# Patient Record
Sex: FEMALE | Race: WHITE | NOT HISPANIC OR LATINO | Employment: OTHER | URBAN - METROPOLITAN AREA
[De-identification: names, ages, dates, MRNs, and addresses within clinical notes are randomized per-mention and may not be internally consistent; named-entity substitution may affect disease eponyms.]

---

## 2017-01-06 ENCOUNTER — OFFICE VISIT (OUTPATIENT)
Dept: FAMILY MEDICINE CLINIC | Facility: CLINIC | Age: 77
End: 2017-01-06

## 2017-01-06 VITALS
SYSTOLIC BLOOD PRESSURE: 152 MMHG | OXYGEN SATURATION: 98 % | DIASTOLIC BLOOD PRESSURE: 92 MMHG | HEART RATE: 90 BPM | RESPIRATION RATE: 24 BRPM | TEMPERATURE: 96.4 F | WEIGHT: 175.5 LBS | BODY MASS INDEX: 34.46 KG/M2 | HEIGHT: 60 IN

## 2017-01-06 DIAGNOSIS — J45.31 MILD PERSISTENT ASTHMA WITH ACUTE EXACERBATION: ICD-10-CM

## 2017-01-06 DIAGNOSIS — J06.9 PROTRACTED URI: Primary | ICD-10-CM

## 2017-01-06 DIAGNOSIS — J98.01 BRONCHOSPASM: ICD-10-CM

## 2017-01-06 PROCEDURE — 99213 OFFICE O/P EST LOW 20 MIN: CPT | Performed by: FAMILY MEDICINE

## 2017-01-06 RX ORDER — MECLIZINE HYDROCHLORIDE 25 MG/1
TABLET ORAL
COMMUNITY
Start: 2016-12-24 | End: 2017-03-24 | Stop reason: SDUPTHER

## 2017-01-06 RX ORDER — PREDNISONE 20 MG/1
40 TABLET ORAL DAILY
Qty: 10 TABLET | Refills: 0 | Status: SHIPPED | OUTPATIENT
Start: 2017-01-06 | End: 2017-01-11

## 2017-01-06 RX ORDER — DOXYCYCLINE HYCLATE 100 MG/1
100 CAPSULE ORAL 2 TIMES DAILY
Qty: 20 CAPSULE | Refills: 0 | Status: SHIPPED | OUTPATIENT
Start: 2017-01-06 | End: 2017-02-01

## 2017-01-06 NOTE — MR AVS SNAPSHOT
Raymond Coburn   1/6/2017 3:00 PM   Office Visit    Dept Phone:  [phone]   Encounter #:  [id number]    Provider:  Dikc Blunt MD   Department:  [facility]                Your Full Care Plan              Today's Medication Changes          These changes are accurate as of: 1/6/17  3:36 PM.  If you have any questions, ask your nurse or doctor.               New Medication(s)Ordered:     doxycycline 100 MG capsule   Commonly known as:  VIBRAMYCIN   Take 1 capsule by mouth 2 (Two) Times a Day.   Started by:  Dick Blunt MD       mometasone-formoterol 100-5 MCG/ACT inhaler   Commonly known as:  DULERA 100   Inhale 2 puffs 2 (Two) Times a Day.   Started by:  Dick Blunt MD         Medication(s)that have changed:     * predniSONE 5 MG tablet   Commonly known as:  DELTASONE   What changed:  Another medication with the same name was added. Make sure you understand how and when to take each.   Changed by:  Dick Blunt MD       * predniSONE 20 MG tablet   Commonly known as:  DELTASONE   Take 2 tablets by mouth Daily for 5 days.   What changed:  You were already taking a medication with the same name, and this prescription was added. Make sure you understand how and when to take each.   Changed by:  Dick Blunt MD       * Notice:  This list has 2 medication(s) that are the same as other medications prescribed for you. Read the directions carefully, and ask your doctor or other care provider to review them with you.      Stop taking medication(s)listed here:     fluticasone-salmeterol 250-50 MCG/DOSE DISKUS   Commonly known as:  ADVAIR   Stopped by:  Dick Blunt MD                Where to Get Your Medications      [facility] [address] - [address] [facility] [place] - [phone]  - [phone]  [address] [facility] [address]     Phone:  [phone]    doxycycline 100 MG capsule    predniSONE 20 MG tablet         Information about where to get these medications is not yet available     ! Ask your nurse or doctor about these medications     mometasone-formoterol 100-5 MCG/ACT inhaler                  Your Updated Medication List          This list is accurate as of: 1/6/17  3:36 PM.  Always use your most recent med list.                albuterol 108 (90 BASE) MCG/ACT inhaler   Commonly known as:  PROVENTIL HFA;VENTOLIN HFA       amLODIPine 10 MG tablet   Commonly known as:  NORVASC   TAKE 1 TABLET EVERY DAY       clotrimazole 1 % cream   Commonly known as:  LOTRIMIN   Apply to affected areas BID       doxycycline 100 MG capsule   Commonly known as:  VIBRAMYCIN   Take 1 capsule by mouth 2 (Two) Times a Day.       DULoxetine 60 MG capsule   Commonly known as:  CYMBALTA   Take 1 capsule by mouth daily.       folic acid 1 MG tablet   Commonly known as:  FOLVITE       gabapentin 800 MG tablet   Commonly known as:  NEURONTIN   TAKE 1 TABLET THREE TIMES DAILY       glipiZIDE 5 MG ER tablet   Commonly known as:  GLUCOTROL   TAKE 1 TABLET TWICE DAILY       hydrOXYzine 25 MG capsule   Commonly known as:  VISTARIL   TAKE 1 CAPSULE TWICE DAILY       lisinopril 20 MG tablet   Commonly known as:  PRINIVIL,ZESTRIL   TAKE 1 TABLET DAILY       meclizine 25 MG tablet   Commonly known as:  ANTIVERT       methotrexate 2.5 MG tablet       metoprolol tartrate 50 MG tablet   Commonly known as:  LOPRESSOR   TAKE 1 TABLET TWICE DAILY       mometasone-formoterol 100-5 MCG/ACT inhaler   Commonly known as:  DULERA 100   Inhale 2 puffs 2 (Two) Times a Day.       oxyCODONE 20 MG tablet   Commonly known as:  ROXICODONE   Take 1 tablet by mouth Every 12 (Twelve) Hours.       potassium chloride 10 MEQ CR tablet   Commonly known as:  K-DUR,KLOR-CON   TAKE 2 TABLETS TWICE DAILY       * predniSONE 5 MG tablet   Commonly known as:  DELTASONE       * predniSONE 20 MG tablet   Commonly known as:   DELTASONE   Take 2 tablets by mouth Daily for 5 days.       simvastatin 20 MG tablet   Commonly known as:  ZOCOR   TAKE 1 TABLET EVERY DAY       traMADol 50 MG tablet   Commonly known as:  ULTRAM   TAKE ONE TO TWO TABLETS BY MOUTH THREE TIMES A DAY       traZODone 100 MG tablet   Commonly known as:  DESYREL   TAKE THREE TABLETS BY MOUTH ONCE NIGHTLY       zolpidem 10 MG tablet   Commonly known as:  AMBIEN   TAKE ONE TABLET BY MOUTH EVERY NIGHT AT BEDTIME       * Notice:  This list has 2 medication(s) that are the same as other medications prescribed for you. Read the directions carefully, and ask your doctor or other care provider to review them with you.            You Were Diagnosed With        Codes Comments    Protracted URI    -  Primary ICD-10-CM: J06.9  ICD-9-CM: 465.9     Bronchospasm     ICD-10-CM: J98.01  ICD-9-CM: 519.11     Mild persistent asthma with acute exacerbation     ICD-10-CM: J45.31  ICD-9-CM: 493.92       Medications to be Given to You by a Medical Professional     Due       Frequency    2017 cyanocobalamin injection 1,000 mcg  Every 28 Days      Instructions     None    Patient Instructions History      Upcoming Appointments     Visit Type Date Time Department    FOLLOW UP 2017  3:00 PM [facility]      [employer] Signup     [facility] [employer] allows you to send messages to your doctor, view your test results, renew your prescriptions, schedule appointments, and more. To sign up, go to [employer] and click on the Sign Up Now link in the New User? box. Enter your [employer] Activation Code exactly as it appears below along with the last four digits of your Social Security Number and your Date of Birth () to complete the sign-up process. If you do not sign up before the expiration date, you must request a new code.    [employer] Activation Code: [email or web address]  Expires: 2017  3:36 PM    If you have questions, you can email [email or web address] or  "call [phone] to talk to our MyChart staff. Remember, MyChart is NOT to be used for urgent needs. For medical emergencies, dial 911.               Other Info from Your Visit           Allergies     Aspirin      Butorphanol      Codeine      Iodine        Reason for Visit     CHENTE patel getting up is white, has been going on since christmas eve she went to ER and hasn't gotten any better    Cough     Nasal Congestion           Vital Signs     Blood Pressure Pulse Temperature Respirations Height Weight    152/92 90 96.4 °F (35.8 °C) (Temporal Artery ) 24 60\" (152.4 cm) 175 lb 8 oz (79.6 kg)    Oxygen Saturation Body Mass Index Smoking Status             98% 34.27 kg/m2 Former Smoker         Problems and Diagnoses Noted     Asthma    Acute upper respiratory infection    -  Primary    Bronchial spasms            "

## 2017-01-06 NOTE — PROGRESS NOTES
Chief Complaint   Patient presents with   • URI     muscus getting up is white, has been going on since christmas eve she went to ER and hasn't gotten any better   • Cough   • Nasal Congestion       Subjective     URI    This is a new problem. The current episode started 1 to 4 weeks ago (was sick Niall story and was seen in ER and given inhaler). The problem has been gradually worsening. There has been no fever. Associated symptoms include congestion, coughing (prod cough, white and milky), rhinorrhea, a sore throat and wheezing. Associated symptoms comments: short of breath.   Cough   Associated symptoms include rhinorrhea, a sore throat, shortness of breath and wheezing. Pertinent negatives include no fever.           Past Medical History,Medications, Allergies, and social history was reviewed.    Review of Systems   Constitutional: Positive for fatigue. Negative for fever.   HENT: Positive for congestion, rhinorrhea and sore throat.    Respiratory: Positive for cough (prod cough, white and milky), shortness of breath and wheezing.    Cardiovascular: Negative.    Gastrointestinal: Negative.    Neurological: Negative.    Psychiatric/Behavioral: Positive for sleep disturbance.       Objective     Physical Exam   Constitutional: She is oriented to person, place, and time. She appears well-developed and well-nourished.   HENT:   Head: Normocephalic and atraumatic.   Right Ear: Hearing, external ear and ear canal normal. Tympanic membrane is retracted. Tympanic membrane is not erythematous.   Left Ear: Hearing, external ear and ear canal normal. Tympanic membrane is retracted. Tympanic membrane is not erythematous.   Nose: Nose normal.   Mouth/Throat: Oropharynx is clear and moist.   Eyes: Conjunctivae and EOM are normal. Pupils are equal, round, and reactive to light.   Neck: Normal range of motion. Neck supple. No thyromegaly present.   Cardiovascular: Normal rate, regular rhythm and normal heart sounds.     Pulmonary/Chest: Effort normal. She has wheezes. She has no rales.   Abdominal: Soft.   Neurological: She is alert and oriented to person, place, and time.   Skin: Skin is warm and dry.   Psychiatric: She has a normal mood and affect. Her behavior is normal.   Nursing note and vitals reviewed.        Assessment/Plan     Problem List Items Addressed This Visit        Respiratory    Asthma    Relevant Medications    doxycycline (VIBRAMYCIN) 100 MG capsule    predniSONE (DELTASONE) 20 MG tablet    mometasone-formoterol (DULERA 100) 100-5 MCG/ACT inhaler      Other Visit Diagnoses     Protracted URI    -  Primary    Relevant Medications    doxycycline (VIBRAMYCIN) 100 MG capsule    predniSONE (DELTASONE) 20 MG tablet    Bronchospasm        Relevant Medications    doxycycline (VIBRAMYCIN) 100 MG capsule    predniSONE (DELTASONE) 20 MG tablet    mometasone-formoterol (DULERA 100) 100-5 MCG/ACT inhaler              DISCUSSION  Start meds  Stop Advair(not using anyway) and try Dulera as noted  Prednisone and doxycyline  Continue ALbuterol As needed  Call in 2-3 days with update  Recommend emergency room evaluation if worsening.  Oxygen level is good.    Return if symptoms worsen or fail to improve.     MEDICATIONS PRESCRIBED  Requested Prescriptions     Signed Prescriptions Disp Refills   • doxycycline (VIBRAMYCIN) 100 MG capsule 20 capsule 0     Sig: Take 1 capsule by mouth 2 (Two) Times a Day.   • predniSONE (DELTASONE) 20 MG tablet 10 tablet 0     Sig: Take 2 tablets by mouth Daily for 5 days.   • mometasone-formoterol (DULERA 100) 100-5 MCG/ACT inhaler 1 inhaler 0     Sig: Inhale 2 puffs 2 (Two) Times a Day.          Dick Blunt MD

## 2017-01-09 ENCOUNTER — TELEPHONE (OUTPATIENT)
Dept: FAMILY MEDICINE CLINIC | Facility: CLINIC | Age: 77
End: 2017-01-09

## 2017-01-09 DIAGNOSIS — G89.4 CHRONIC PAIN SYNDROME: ICD-10-CM

## 2017-01-09 DIAGNOSIS — M06.9 RHEUMATOID ARTHRITIS INVOLVING MULTIPLE SITES, UNSPECIFIED RHEUMATOID FACTOR PRESENCE: ICD-10-CM

## 2017-01-09 RX ORDER — OXYCODONE HYDROCHLORIDE 20 MG/1
20 TABLET ORAL EVERY 12 HOURS
Qty: 60 TABLET | Refills: 0 | Status: SHIPPED | OUTPATIENT
Start: 2017-01-09 | End: 2017-02-09 | Stop reason: SDUPTHER

## 2017-01-09 NOTE — TELEPHONE ENCOUNTER
Okay to  and please run Obi.  How is she feeling today?  She was seen last week when she was sick.

## 2017-01-09 NOTE — TELEPHONE ENCOUNTER
----- Message from Arianne Syed sent at 1/9/2017  9:31 AM EST -----  Contact: Blunt  Patient calling in refill for Oxycodone 20mg. Please call her when ready at [phone].

## 2017-01-10 ENCOUNTER — CLINICAL SUPPORT (OUTPATIENT)
Dept: FAMILY MEDICINE CLINIC | Facility: CLINIC | Age: 77
End: 2017-01-10

## 2017-01-10 DIAGNOSIS — E53.8 LOW VITAMIN B12 LEVEL: ICD-10-CM

## 2017-01-10 PROCEDURE — 96372 THER/PROPH/DIAG INJ SC/IM: CPT | Performed by: FAMILY MEDICINE

## 2017-01-10 RX ADMIN — CYANOCOBALAMIN 1000 MCG: 1000 INJECTION, SOLUTION INTRAMUSCULAR; SUBCUTANEOUS at 15:18

## 2017-01-30 ENCOUNTER — TELEPHONE (OUTPATIENT)
Dept: FAMILY MEDICINE CLINIC | Facility: CLINIC | Age: 77
End: 2017-01-30

## 2017-02-01 DIAGNOSIS — F32.A DEPRESSION: ICD-10-CM

## 2017-02-01 RX ORDER — AZITHROMYCIN 250 MG/1
TABLET, FILM COATED ORAL
Qty: 6 TABLET | Refills: 0 | Status: SHIPPED | OUTPATIENT
Start: 2017-02-01 | End: 2017-03-24

## 2017-02-01 NOTE — TELEPHONE ENCOUNTER
Diarrhea could be due to antibiotic usage. Encourage probiotics or yogurt with probiotics in it. Can use OTC Imodium as directed for diarrhea. Increase fluid intake.   Can try treatment with zpak since she has already completed Doxycycline and steroids. Sent to [facility]

## 2017-02-01 NOTE — TELEPHONE ENCOUNTER
Pt is continuing to have runny nose and congestion, no color with drainage and cough, pt has been having diarrhea   and oh her niece isn't sure if this is part of this that pt has going on or something separate.   Please call [phone] oh

## 2017-02-02 RX ORDER — DULOXETIN HYDROCHLORIDE 60 MG/1
CAPSULE, DELAYED RELEASE ORAL
Qty: 90 CAPSULE | Refills: 1 | Status: SHIPPED | OUTPATIENT
Start: 2017-02-02 | End: 2017-09-28 | Stop reason: SDUPTHER

## 2017-02-02 RX ORDER — GABAPENTIN 800 MG/1
TABLET ORAL
Qty: 270 TABLET | Refills: 1 | Status: SHIPPED | OUTPATIENT
Start: 2017-02-02 | End: 2017-09-28 | Stop reason: SDUPTHER

## 2017-02-02 RX ORDER — SIMVASTATIN 20 MG
TABLET ORAL
Qty: 90 TABLET | Refills: 0 | Status: SHIPPED | OUTPATIENT
Start: 2017-02-02 | End: 2017-05-25 | Stop reason: SDUPTHER

## 2017-02-02 RX ORDER — POTASSIUM CHLORIDE 750 MG/1
TABLET, EXTENDED RELEASE ORAL
Qty: 360 TABLET | Refills: 1 | Status: SHIPPED | OUTPATIENT
Start: 2017-02-02 | End: 2017-09-28 | Stop reason: SDUPTHER

## 2017-02-09 ENCOUNTER — TELEPHONE (OUTPATIENT)
Dept: FAMILY MEDICINE CLINIC | Facility: CLINIC | Age: 77
End: 2017-02-09

## 2017-02-09 DIAGNOSIS — G89.4 CHRONIC PAIN SYNDROME: ICD-10-CM

## 2017-02-09 DIAGNOSIS — M06.9 RHEUMATOID ARTHRITIS INVOLVING MULTIPLE SITES, UNSPECIFIED RHEUMATOID FACTOR PRESENCE: ICD-10-CM

## 2017-02-09 RX ORDER — OXYCODONE HYDROCHLORIDE 20 MG/1
20 TABLET ORAL EVERY 12 HOURS
Qty: 60 TABLET | Refills: 0 | Status: SHIPPED | OUTPATIENT
Start: 2017-02-09 | End: 2017-03-07 | Stop reason: SDUPTHER

## 2017-02-09 NOTE — TELEPHONE ENCOUNTER
----- Message from Arianne Syed sent at 2/9/2017 11:34 AM EST -----  Contact: Bulnt  Patient is needing a refill on Oxycodone 20mg. Please call when ready [phone].

## 2017-02-13 ENCOUNTER — CLINICAL SUPPORT (OUTPATIENT)
Dept: FAMILY MEDICINE CLINIC | Facility: CLINIC | Age: 77
End: 2017-02-13

## 2017-02-13 DIAGNOSIS — E78.2 MIXED HYPERLIPIDEMIA: ICD-10-CM

## 2017-02-13 DIAGNOSIS — E53.8 LOW VITAMIN B12 LEVEL: ICD-10-CM

## 2017-02-13 DIAGNOSIS — I10 BENIGN ESSENTIAL HYPERTENSION: Primary | ICD-10-CM

## 2017-02-13 DIAGNOSIS — G47.09 OTHER INSOMNIA: ICD-10-CM

## 2017-02-13 DIAGNOSIS — E11.9 TYPE 2 DIABETES MELLITUS WITHOUT COMPLICATION, WITHOUT LONG-TERM CURRENT USE OF INSULIN (HCC): ICD-10-CM

## 2017-02-13 DIAGNOSIS — E53.8 VITAMIN B12 DEFICIENCY: ICD-10-CM

## 2017-02-13 PROCEDURE — 96372 THER/PROPH/DIAG INJ SC/IM: CPT | Performed by: FAMILY MEDICINE

## 2017-02-13 RX ORDER — TRAZODONE HYDROCHLORIDE 100 MG/1
TABLET ORAL
Qty: 270 TABLET | Refills: 0 | Status: SHIPPED | OUTPATIENT
Start: 2017-02-13 | End: 2017-03-24 | Stop reason: SDUPTHER

## 2017-02-13 RX ADMIN — CYANOCOBALAMIN 1000 MCG: 1000 INJECTION, SOLUTION INTRAMUSCULAR; SUBCUTANEOUS at 16:41

## 2017-02-14 LAB
ALBUMIN SERPL-MCNC: 4.5 G/DL (ref 3.2–4.8)
ALBUMIN/GLOB SERPL: 1.7 G/DL (ref 1.5–2.5)
ALP SERPL-CCNC: 69 U/L (ref 25–100)
ALT SERPL-CCNC: 15 U/L (ref 7–40)
AST SERPL-CCNC: 22 U/L (ref 0–33)
BASOPHILS # BLD AUTO: 0.04 10*3/MM3 (ref 0–0.2)
BASOPHILS NFR BLD AUTO: 0.5 % (ref 0–1)
BILIRUB SERPL-MCNC: 0.6 MG/DL (ref 0.3–1.2)
BUN SERPL-MCNC: 14 MG/DL (ref 9–23)
BUN/CREAT SERPL: 17.5 (ref 7–25)
CALCIUM SERPL-MCNC: 10.8 MG/DL (ref 8.7–10.4)
CHLORIDE SERPL-SCNC: 107 MMOL/L (ref 99–109)
CHOLEST SERPL-MCNC: 207 MG/DL (ref 0–200)
CHOLEST/HDLC SERPL: 3.76 {RATIO}
CO2 SERPL-SCNC: 31 MMOL/L (ref 20–31)
CREAT SERPL-MCNC: 0.8 MG/DL (ref 0.6–1.3)
EOSINOPHIL # BLD AUTO: 0.05 10*3/MM3 (ref 0.1–0.3)
EOSINOPHIL NFR BLD AUTO: 0.6 % (ref 0–3)
ERYTHROCYTE [DISTWIDTH] IN BLOOD BY AUTOMATED COUNT: 14.1 % (ref 11.3–14.5)
GLOBULIN SER CALC-MCNC: 2.6 GM/DL
GLUCOSE SERPL-MCNC: 102 MG/DL (ref 70–100)
HBA1C MFR BLD: 5.5 % (ref 4.8–5.6)
HCT VFR BLD AUTO: 44.4 % (ref 34.5–44)
HDLC SERPL-MCNC: 55 MG/DL (ref 40–60)
HGB BLD-MCNC: 14.7 G/DL (ref 11.5–15.5)
IMM GRANULOCYTES # BLD: 0.06 10*3/MM3 (ref 0–0.03)
IMM GRANULOCYTES NFR BLD: 0.7 % (ref 0–0.6)
LDLC SERPL CALC-MCNC: 107 MG/DL (ref 0–100)
LYMPHOCYTES # BLD AUTO: 1.24 10*3/MM3 (ref 0.6–4.8)
LYMPHOCYTES NFR BLD AUTO: 14.4 % (ref 24–44)
MCH RBC QN AUTO: 32.8 PG (ref 27–31)
MCHC RBC AUTO-ENTMCNC: 33.1 G/DL (ref 32–36)
MCV RBC AUTO: 99.1 FL (ref 80–99)
MONOCYTES # BLD AUTO: 0.26 10*3/MM3 (ref 0–1)
MONOCYTES NFR BLD AUTO: 3 % (ref 0–12)
NEUTROPHILS # BLD AUTO: 6.95 10*3/MM3 (ref 1.5–8.3)
NEUTROPHILS NFR BLD AUTO: 80.8 % (ref 41–71)
PLATELET # BLD AUTO: 213 10*3/MM3 (ref 150–450)
POTASSIUM SERPL-SCNC: 5.5 MMOL/L (ref 3.5–5.5)
PROT SERPL-MCNC: 7.1 G/DL (ref 5.7–8.2)
RBC # BLD AUTO: 4.48 10*6/MM3 (ref 3.89–5.14)
SODIUM SERPL-SCNC: 141 MMOL/L (ref 132–146)
TRIGL SERPL-MCNC: 225 MG/DL (ref 0–150)
VIT B12 SERPL-MCNC: 460 PG/ML (ref 211–911)
VLDLC SERPL CALC-MCNC: 45 MG/DL
WBC # BLD AUTO: 8.6 10*3/MM3 (ref 3.5–10.8)

## 2017-03-07 ENCOUNTER — TELEPHONE (OUTPATIENT)
Dept: FAMILY MEDICINE CLINIC | Facility: CLINIC | Age: 77
End: 2017-03-07

## 2017-03-07 DIAGNOSIS — M06.9 RHEUMATOID ARTHRITIS INVOLVING MULTIPLE SITES, UNSPECIFIED RHEUMATOID FACTOR PRESENCE: ICD-10-CM

## 2017-03-07 DIAGNOSIS — G89.4 CHRONIC PAIN SYNDROME: ICD-10-CM

## 2017-03-07 RX ORDER — OXYCODONE HYDROCHLORIDE 20 MG/1
20 TABLET ORAL EVERY 12 HOURS
Qty: 60 TABLET | Refills: 0 | Status: SHIPPED | OUTPATIENT
Start: 2017-03-07 | End: 2017-03-24 | Stop reason: SDUPTHER

## 2017-03-07 NOTE — TELEPHONE ENCOUNTER
----- Message from Rosemary Mendoza sent at 3/7/2017 11:56 AM EST -----  Contact: RHONDA PEREA ON OXYCODONE 20MG    [phone]  MESSAGE OK

## 2017-03-24 ENCOUNTER — OFFICE VISIT (OUTPATIENT)
Dept: FAMILY MEDICINE CLINIC | Facility: CLINIC | Age: 77
End: 2017-03-24

## 2017-03-24 VITALS
SYSTOLIC BLOOD PRESSURE: 124 MMHG | DIASTOLIC BLOOD PRESSURE: 64 MMHG | BODY MASS INDEX: 35.54 KG/M2 | WEIGHT: 182 LBS | OXYGEN SATURATION: 93 % | HEART RATE: 60 BPM | RESPIRATION RATE: 16 BRPM | TEMPERATURE: 96.6 F

## 2017-03-24 DIAGNOSIS — E53.8 LOW VITAMIN B12 LEVEL: ICD-10-CM

## 2017-03-24 DIAGNOSIS — R42 DIZZINESS: Primary | ICD-10-CM

## 2017-03-24 DIAGNOSIS — G47.09 OTHER INSOMNIA: ICD-10-CM

## 2017-03-24 DIAGNOSIS — M06.9 RHEUMATOID ARTHRITIS INVOLVING MULTIPLE SITES, UNSPECIFIED RHEUMATOID FACTOR PRESENCE: ICD-10-CM

## 2017-03-24 DIAGNOSIS — I10 BENIGN ESSENTIAL HYPERTENSION: ICD-10-CM

## 2017-03-24 DIAGNOSIS — R19.7 DIARRHEA, UNSPECIFIED TYPE: ICD-10-CM

## 2017-03-24 DIAGNOSIS — G89.4 CHRONIC PAIN SYNDROME: ICD-10-CM

## 2017-03-24 PROCEDURE — 96372 THER/PROPH/DIAG INJ SC/IM: CPT | Performed by: FAMILY MEDICINE

## 2017-03-24 PROCEDURE — 99214 OFFICE O/P EST MOD 30 MIN: CPT | Performed by: FAMILY MEDICINE

## 2017-03-24 RX ORDER — MECLIZINE HYDROCHLORIDE 25 MG/1
25 TABLET ORAL 3 TIMES DAILY PRN
Qty: 30 TABLET | Refills: 1 | Status: SHIPPED | OUTPATIENT
Start: 2017-03-24 | End: 2017-12-18

## 2017-03-24 RX ORDER — TRAZODONE HYDROCHLORIDE 100 MG/1
200 TABLET ORAL NIGHTLY
Refills: 0 | COMMUNITY
Start: 2017-03-24 | End: 2017-09-18 | Stop reason: SDUPTHER

## 2017-03-24 RX ORDER — OXYCODONE HYDROCHLORIDE 20 MG/1
20 TABLET ORAL EVERY 12 HOURS
Qty: 60 TABLET | Refills: 0 | Status: SHIPPED | OUTPATIENT
Start: 2017-04-05 | End: 2017-05-15 | Stop reason: SDUPTHER

## 2017-03-24 RX ADMIN — CYANOCOBALAMIN 1000 MCG: 1000 INJECTION, SOLUTION INTRAMUSCULAR; SUBCUTANEOUS at 15:22

## 2017-03-24 NOTE — ASSESSMENT & PLAN NOTE
Hypertension is improving with treatment.  Continue current treatment regimen.  Stop smoking.  Continue current medications.  Blood pressure will be reassessed at the next regular appointment.

## 2017-03-24 NOTE — ASSESSMENT & PLAN NOTE
Decrease to 2 trazodone at night since it does not seem that 3 is even helping and she has had increased problems with confusion.  They will call if not improving with this and may need to consider decreasing gabapentin or the trazodone further.
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
## 2017-05-15 ENCOUNTER — TELEPHONE (OUTPATIENT)
Dept: FAMILY MEDICINE CLINIC | Facility: CLINIC | Age: 77
End: 2017-05-15

## 2017-05-15 DIAGNOSIS — G89.4 CHRONIC PAIN SYNDROME: ICD-10-CM

## 2017-05-15 DIAGNOSIS — M06.9 RHEUMATOID ARTHRITIS INVOLVING MULTIPLE SITES, UNSPECIFIED RHEUMATOID FACTOR PRESENCE: ICD-10-CM

## 2017-05-15 RX ORDER — OXYCODONE HYDROCHLORIDE 20 MG/1
20 TABLET ORAL EVERY 12 HOURS
Qty: 60 TABLET | Refills: 0 | Status: SHIPPED | OUTPATIENT
Start: 2017-05-15 | End: 2017-06-13 | Stop reason: SDUPTHER

## 2017-05-22 ENCOUNTER — CLINICAL SUPPORT (OUTPATIENT)
Dept: FAMILY MEDICINE CLINIC | Facility: CLINIC | Age: 77
End: 2017-05-22

## 2017-05-22 DIAGNOSIS — E11.9 DIABETES MELLITUS TYPE 2, NONINSULIN DEPENDENT (HCC): Primary | ICD-10-CM

## 2017-05-22 DIAGNOSIS — E53.8 VITAMIN B12 DEFICIENCY: ICD-10-CM

## 2017-05-22 DIAGNOSIS — E53.8 VITAMIN B12 DEFICIENCY: Primary | ICD-10-CM

## 2017-05-22 DIAGNOSIS — E78.5 HYPERLIPIDEMIA, UNSPECIFIED: ICD-10-CM

## 2017-05-22 PROCEDURE — 96372 THER/PROPH/DIAG INJ SC/IM: CPT | Performed by: FAMILY MEDICINE

## 2017-05-22 RX ADMIN — CYANOCOBALAMIN 1000 MCG: 1000 INJECTION, SOLUTION INTRAMUSCULAR; SUBCUTANEOUS at 15:38

## 2017-05-23 LAB
ALBUMIN SERPL-MCNC: 4.3 G/DL (ref 3.2–4.8)
ALBUMIN/GLOB SERPL: 1.7 G/DL (ref 1.5–2.5)
ALP SERPL-CCNC: 62 U/L (ref 25–100)
ALT SERPL-CCNC: 14 U/L (ref 7–40)
AST SERPL-CCNC: 20 U/L (ref 0–33)
BILIRUB SERPL-MCNC: 0.8 MG/DL (ref 0.3–1.2)
BUN SERPL-MCNC: 14 MG/DL (ref 9–23)
BUN/CREAT SERPL: 14 (ref 7–25)
CALCIUM SERPL-MCNC: 10.2 MG/DL (ref 8.7–10.4)
CHLORIDE SERPL-SCNC: 108 MMOL/L (ref 99–109)
CHOLEST SERPL-MCNC: 191 MG/DL (ref 0–200)
CHOLEST/HDLC SERPL: 4.06 {RATIO}
CO2 SERPL-SCNC: 32 MMOL/L (ref 20–31)
CREAT SERPL-MCNC: 1 MG/DL (ref 0.6–1.3)
GLOBULIN SER CALC-MCNC: 2.5 GM/DL
GLUCOSE SERPL-MCNC: 113 MG/DL (ref 70–100)
HBA1C MFR BLD: 5.1 % (ref 4.8–5.6)
HDLC SERPL-MCNC: 47 MG/DL (ref 40–60)
LDLC SERPL CALC-MCNC: 103 MG/DL (ref 0–100)
POTASSIUM SERPL-SCNC: 4.2 MMOL/L (ref 3.5–5.5)
PROT SERPL-MCNC: 6.8 G/DL (ref 5.7–8.2)
SODIUM SERPL-SCNC: 141 MMOL/L (ref 132–146)
TRIGL SERPL-MCNC: 204 MG/DL (ref 0–150)
VIT B12 SERPL-MCNC: 322 PG/ML (ref 211–911)
VLDLC SERPL CALC-MCNC: 40.8 MG/DL

## 2017-05-25 DIAGNOSIS — I10 BENIGN ESSENTIAL HYPERTENSION: ICD-10-CM

## 2017-05-25 DIAGNOSIS — F32.A DEPRESSION: ICD-10-CM

## 2017-05-25 RX ORDER — HYDROXYZINE PAMOATE 25 MG/1
CAPSULE ORAL
Qty: 180 CAPSULE | Refills: 1 | Status: SHIPPED | OUTPATIENT
Start: 2017-05-25 | End: 2017-12-18 | Stop reason: SDUPTHER

## 2017-05-25 RX ORDER — METOPROLOL TARTRATE 50 MG/1
TABLET, FILM COATED ORAL
Qty: 180 TABLET | Refills: 1 | Status: SHIPPED | OUTPATIENT
Start: 2017-05-25 | End: 2017-12-18 | Stop reason: SDUPTHER

## 2017-05-25 RX ORDER — LISINOPRIL 20 MG/1
TABLET ORAL
Qty: 90 TABLET | Refills: 1 | Status: SHIPPED | OUTPATIENT
Start: 2017-05-25 | End: 2017-12-18 | Stop reason: SDUPTHER

## 2017-05-25 RX ORDER — AMLODIPINE BESYLATE 10 MG/1
TABLET ORAL
Qty: 90 TABLET | Refills: 1 | Status: SHIPPED | OUTPATIENT
Start: 2017-05-25 | End: 2017-12-18 | Stop reason: SDUPTHER

## 2017-05-25 RX ORDER — GLIPIZIDE 5 MG/1
TABLET, EXTENDED RELEASE ORAL
Qty: 180 TABLET | Refills: 1 | Status: SHIPPED | OUTPATIENT
Start: 2017-05-25 | End: 2017-12-18 | Stop reason: SDUPTHER

## 2017-05-25 RX ORDER — SIMVASTATIN 20 MG
TABLET ORAL
Qty: 90 TABLET | Refills: 0 | Status: SHIPPED | OUTPATIENT
Start: 2017-05-25 | End: 2017-09-28 | Stop reason: SDUPTHER

## 2017-06-05 DIAGNOSIS — G47.09 OTHER INSOMNIA: ICD-10-CM

## 2017-06-05 NOTE — TELEPHONE ENCOUNTER
Please call patient and confirm.  We had decreased her trazodone to 2 tablets instead of 3 tablets.  Getting a refill request for the 3 tablets.  I need to make sure that she has decreased the trazodone to 100 mg 2 tablets at night and not 3.  Once confirmed, let me know and can reorder.

## 2017-06-06 RX ORDER — TRAZODONE HYDROCHLORIDE 100 MG/1
200 TABLET ORAL NIGHTLY
Qty: 180 TABLET | Refills: 1 | Status: SHIPPED | OUTPATIENT
Start: 2017-06-06 | End: 2017-06-20

## 2017-06-13 ENCOUNTER — TELEPHONE (OUTPATIENT)
Dept: FAMILY MEDICINE CLINIC | Facility: CLINIC | Age: 77
End: 2017-06-13

## 2017-06-13 DIAGNOSIS — M06.9 RHEUMATOID ARTHRITIS INVOLVING MULTIPLE SITES, UNSPECIFIED RHEUMATOID FACTOR PRESENCE: ICD-10-CM

## 2017-06-13 DIAGNOSIS — G89.4 CHRONIC PAIN SYNDROME: ICD-10-CM

## 2017-06-13 RX ORDER — OXYCODONE HYDROCHLORIDE 20 MG/1
20 TABLET ORAL EVERY 12 HOURS
Qty: 60 TABLET | Refills: 0 | Status: SHIPPED | OUTPATIENT
Start: 2017-06-13 | End: 2017-07-13

## 2017-06-13 NOTE — TELEPHONE ENCOUNTER
----- Message from Rosemary Mendoza sent at 6/13/2017  3:13 PM EDT -----  Contact: [facility] CALLED  REFILL ON OXYCODONE 20MG    [phone]  MESSAGE OK

## 2017-06-20 ENCOUNTER — OFFICE VISIT (OUTPATIENT)
Dept: FAMILY MEDICINE CLINIC | Facility: CLINIC | Age: 77
End: 2017-06-20

## 2017-06-20 VITALS
DIASTOLIC BLOOD PRESSURE: 66 MMHG | TEMPERATURE: 96.8 F | RESPIRATION RATE: 16 BRPM | OXYGEN SATURATION: 94 % | HEART RATE: 49 BPM | HEIGHT: 60 IN | SYSTOLIC BLOOD PRESSURE: 146 MMHG | BODY MASS INDEX: 34.75 KG/M2 | WEIGHT: 177 LBS

## 2017-06-20 DIAGNOSIS — M75.41 IMPINGEMENT SYNDROME OF SHOULDER REGION, RIGHT: ICD-10-CM

## 2017-06-20 DIAGNOSIS — I10 BENIGN ESSENTIAL HYPERTENSION: Primary | ICD-10-CM

## 2017-06-20 DIAGNOSIS — M06.9 RHEUMATOID ARTHRITIS INVOLVING MULTIPLE SITES, UNSPECIFIED RHEUMATOID FACTOR PRESENCE: ICD-10-CM

## 2017-06-20 DIAGNOSIS — E53.8 VITAMIN B12 DEFICIENCY: ICD-10-CM

## 2017-06-20 DIAGNOSIS — E11.9 TYPE 2 DIABETES MELLITUS WITHOUT COMPLICATION, WITHOUT LONG-TERM CURRENT USE OF INSULIN (HCC): ICD-10-CM

## 2017-06-20 PROCEDURE — 96372 THER/PROPH/DIAG INJ SC/IM: CPT | Performed by: FAMILY MEDICINE

## 2017-06-20 PROCEDURE — 99214 OFFICE O/P EST MOD 30 MIN: CPT | Performed by: FAMILY MEDICINE

## 2017-06-20 RX ADMIN — CYANOCOBALAMIN 1000 MCG: 1000 INJECTION, SOLUTION INTRAMUSCULAR; SUBCUTANEOUS at 16:04

## 2017-06-20 NOTE — PROGRESS NOTES
Chief Complaint   Patient presents with   • Hypertension   • Hyperlipidemia   • Depression   • Med Refill   • Diabetes       Subjective     Hypertension   This is a chronic problem. The current episode started more than 1 year ago. The problem is unchanged. The problem is controlled. Associated symptoms include malaise/fatigue. Pertinent negatives include no chest pain, peripheral edema or shortness of breath. There are no associated agents to hypertension. Risk factors for coronary artery disease include smoking/tobacco exposure. There is no history of angina, kidney disease or CAD/MI. There is no history of chronic renal disease.   Hyperlipidemia   This is a chronic problem. The current episode started more than 1 year ago. The problem is controlled. Recent lipid tests were reviewed and are normal. She has no history of chronic renal disease. Associated symptoms include myalgias. Pertinent negatives include no chest pain or shortness of breath. Current antihyperlipidemic treatment includes statins. The current treatment provides moderate improvement of lipids. There are no compliance problems.  Risk factors for coronary artery disease include diabetes mellitus, dyslipidemia and hypertension.   Depression   Visit Type: follow-up  Patient presents with the following symptoms: confusion, depressed mood (stable) and insomnia.  Patient is not experiencing: shortness of breath.  Frequency of symptoms: most days   Severity: mild   Sleep quality: poor      Diabetes   She presents for her follow-up diabetic visit. She has type 2 diabetes mellitus. Her disease course has been stable. Hypoglycemia symptoms include confusion. Pertinent negatives for diabetes include no chest pain. There are no hypoglycemic complications. Symptoms are stable. There are no diabetic complications. Current diabetic treatment includes oral agent (monotherapy). She is compliant with treatment all of the time. Her weight is decreasing steadily.  "Diabetic current diet: eats some sweets a time. She rarely participates in exercise. (Does not check sugars) An ACE inhibitor/angiotensin II receptor blocker is being taken. Eye exam is current.       RA  no change   Chronic pain in hands and wrist  HArd to  heavy things  Pain meds + help  On oxycodone and methotrexate    Insomnia  Sleep late in the am   Goes to bed: 12 am and falls asleep 2 am and up and 2-3 pm      Past Medical History,Medications, Allergies, and social history was reviewed.    Review of Systems   Constitutional: Positive for malaise/fatigue.   HENT: Negative.    Respiratory: Negative.  Negative for shortness of breath.    Cardiovascular: Negative.  Negative for chest pain.   Gastrointestinal: Negative.    Genitourinary: Negative.    Musculoskeletal: Positive for arthralgias, back pain, gait problem and myalgias.   Psychiatric/Behavioral: Positive for confusion and sleep disturbance. The patient has insomnia.        Objective     Vitals:    06/20/17 1535   BP: 146/66   Pulse: (!) 49   Resp: 16   Temp: 96.8 °F (36 °C)   TempSrc: Temporal Artery    SpO2: 94%   Weight: 177 lb (80.3 kg)   Height: 60\" (152.4 cm)        Physical Exam   Constitutional: She is oriented to person, place, and time. She appears well-developed and well-nourished.   HENT:   Head: Normocephalic and atraumatic.   Right Ear: Hearing, tympanic membrane, external ear and ear canal normal.   Left Ear: Hearing, tympanic membrane, external ear and ear canal normal.   Mouth/Throat: Oropharynx is clear and moist.   Eyes: Conjunctivae and EOM are normal. Pupils are equal, round, and reactive to light.   Neck: Normal range of motion. Neck supple. No thyromegaly present.   Cardiovascular: Normal rate, regular rhythm and normal heart sounds.  Exam reveals no gallop and no friction rub.    No murmur heard.  Pulmonary/Chest: Effort normal and breath sounds normal. No respiratory distress. She has no wheezes. She has no rales. "   Abdominal: Soft. Bowel sounds are normal.   Musculoskeletal: She exhibits no edema.   Chronic RA changes of hands with pain and tenderness.   Neurological: She is alert and oriented to person, place, and time. No sensory deficit. She displays a negative Romberg sign. Gait normal.   Reflex Scores:       Patellar reflexes are 2+ on the right side and 2+ on the left side.  Skin: Skin is warm and dry.   Psychiatric: She has a normal mood and affect. Her behavior is normal.   Seems a little confused.  We will ask repeated questions.   Nursing note and vitals reviewed.        Assessment/Plan     Problem List Items Addressed This Visit        Cardiovascular and Mediastinum    Benign essential hypertension - Primary       Endocrine    Diabetes mellitus       Musculoskeletal and Integument    Rheumatoid arthritis       Other    Impingement syndrome of shoulder region      Other Visit Diagnoses     Vitamin B12 deficiency               Follow up: Return in about 3 months (around 9/20/2017).     Obi dated on 5/16/2017  was reviewed and appropriate.      DISCUSSION  Reviewed recent blood work.  A1c was good.  Cholesterol stable.    Hypertension.  Stable.  Continue medication.    Diabetes mellitus.  Stable.    Rheumatoid arthritis with chronic pain.  Continue pain medication.  She has prescription at this time.  No evidence or denies misuse or diversion.    B12 deficiency.  Continue monthly B12 shots.    She is wondering why we stopped her 3 trazodone and decrease to 2 trazodone at night.  It sounds like she is sleeping much too late during the day and going to bed late.  However she expects to fall asleep at midnight even though she is getting up at 1 or 2 even 3 PM in the day.  Recommend that she get up at the same time every morning between 8 and 9 AM and stay up all day and try to get a sleep between 10 and midnight.  It may take several weeks to get back on track.  I explained to her that increasing the trazodone would  not help her get to sleep any better and may only worsen problems with confusion.  She expressed understanding but then asked again.  I explained it twice.    Her niece Noris is here who helps her and handles her medication.    MEDICATIONS PRESCRIBED  Requested Prescriptions      No prescriptions requested or ordered in this encounter          Dick Blunt MD

## 2017-07-03 ENCOUNTER — OFFICE VISIT (OUTPATIENT)
Dept: FAMILY MEDICINE CLINIC | Facility: CLINIC | Age: 77
End: 2017-07-03

## 2017-07-03 VITALS
RESPIRATION RATE: 16 BRPM | TEMPERATURE: 98.7 F | SYSTOLIC BLOOD PRESSURE: 156 MMHG | HEIGHT: 60 IN | BODY MASS INDEX: 35.14 KG/M2 | HEART RATE: 46 BPM | DIASTOLIC BLOOD PRESSURE: 66 MMHG | WEIGHT: 179 LBS | OXYGEN SATURATION: 96 %

## 2017-07-03 DIAGNOSIS — R41.0 CONFUSION: Primary | ICD-10-CM

## 2017-07-03 PROCEDURE — 99214 OFFICE O/P EST MOD 30 MIN: CPT | Performed by: FAMILY MEDICINE

## 2017-07-03 NOTE — PROGRESS NOTES
Chief Complaint   Patient presents with   • Altered Mental Status     on Wed pt had episode all day pt was confused and had dreamed of mother who has been past for over 20 yrs. pt grabbed her purse and drove all around looking for her mother, went to [place] and pt was confused on where she lived she kept telling everyone that she lived in [facility] and pt went to [facility] as she wanted to eat in cafe and from there they called her lynn casiano and they thought she needed to talk to Dr Blunt. someone at hospital seen pt was confused and they directed her to ER        Subjective     HPI    Confusion  Had episode of confusion  Had a dream that her mother was there and then she fed her and then she was gone so patient though she had left and then left and drove around looking for her. Drove all around to try to find her. Drove to [place] and [place]. She  25-30 yrs ago.   She was telling people she was from [place]  Had been driving and gone form 8:30 am to 10 pm    Has not been on Ambien for awhile    Wound up at [facility] ER. She was hungry and went there.   She realized she had dreamt this.    Noris was here and states not confused now. Has been acting ok.     Had seen Dr Renata Blunt in the past for confusion.   She was doing ok and then was not needed to follow up for 6 months and to be seen : 2017    Wants to stay at apartment for as long as possible        Past Medical History,Medications, Allergies, and social history was reviewed.    Review of Systems   Constitutional: Positive for fatigue.   HENT: Negative.    Respiratory: Positive for cough (chronic).    Cardiovascular: Negative.    Gastrointestinal: Negative.    Musculoskeletal: Positive for arthralgias (Chronic pain due to rheumatoid arthritis).   Neurological: Negative.    Psychiatric/Behavioral: Positive for confusion (see HPI) and sleep disturbance. The patient is nervous/anxious.        Objective     Vitals:    17 1450   BP: 156/66  "  Pulse: (!) 46   Resp: 16   Temp: 98.7 °F (37.1 °C)   TempSrc: Temporal Artery    SpO2: 96%   Weight: 179 lb (81.2 kg)   Height: 60\" (152.4 cm)        Physical Exam   Constitutional: She is oriented to person, place, and time. She appears well-developed and well-nourished.   HENT:   Head: Normocephalic and atraumatic.   Right Ear: Hearing and external ear normal.   Left Ear: Hearing and external ear normal.   Mouth/Throat: Oropharynx is clear and moist.   Eyes: Conjunctivae and EOM are normal. Pupils are equal, round, and reactive to light.   Cardiovascular: Normal rate, regular rhythm and normal heart sounds.  Exam reveals no friction rub.    No murmur heard.  Pulmonary/Chest: Effort normal and breath sounds normal. No respiratory distress. She has no wheezes. She has no rales.   Musculoskeletal: She exhibits no edema.   Neurological: She is alert and oriented to person, place, and time.   Skin: Skin is warm.   Psychiatric: She has a normal mood and affect. Her behavior is normal. Judgment and thought content normal. Cognition and memory are normal.   Tearful at times     Nursing note and vitals reviewed.    HR 60 on exam.     2017, July 3rd. Know where to look on calendar    WORLD...DLORW. Missed one    Apple Ball Coat.  ... apple coat ball(with prompt).     Assessment/Plan     Problem List Items Addressed This Visit     None      Visit Diagnoses     Confusion    -  Primary           Follow up: Return in about 1 month (around 8/3/2017).         DISCUSSION  Rec continue observation  Follow up with Dr Renata Blunt if having any further issues    Not sure what caused the episode of confusion.  Could be some of her medications but she has been on these for quite some time.  She is not taking Ambien and we are to decrease the trazodone.    We will need further evaluation if symptoms return.    Discussed with niece, Noris, about GPS  tracking in case she does this again.  She will look into.    Follow-up for regular " scheduled appointment or sooner with problems.      MEDICATIONS PRESCRIBED  Requested Prescriptions      No prescriptions requested or ordered in this encounter          Dick Blunt MD

## 2017-07-17 ENCOUNTER — TELEPHONE (OUTPATIENT)
Dept: FAMILY MEDICINE CLINIC | Facility: CLINIC | Age: 77
End: 2017-07-17

## 2017-07-17 RX ORDER — OXYCODONE HCL 20 MG/1
20 TABLET, FILM COATED, EXTENDED RELEASE ORAL EVERY 12 HOURS
Qty: 60 TABLET | Refills: 0 | Status: SHIPPED | OUTPATIENT
Start: 2017-07-17 | End: 2017-07-18

## 2017-07-17 NOTE — TELEPHONE ENCOUNTER
----- Message from Orquidea Perez sent at 7/17/2017  4:04 PM EDT -----  Contact: [facility]  PATIENT IS NEEDING A REFILL ON HIS OXYCODONE 20 MG 2 X A DAY PATIENT WAS ADVISED FOR 48 HOUR TURNAROUND TIME PLEASE CALL  OK TO LEAVE MS

## 2017-07-17 NOTE — TELEPHONE ENCOUNTER
Odd, could not find this on medication list but was on amparo and Dr. Blunt notes refilled in past.  Script printed out as per amparo

## 2017-07-18 DIAGNOSIS — M06.9 RHEUMATOID ARTHRITIS INVOLVING MULTIPLE SITES, UNSPECIFIED RHEUMATOID FACTOR PRESENCE: ICD-10-CM

## 2017-07-18 DIAGNOSIS — G89.4 CHRONIC PAIN SYNDROME: ICD-10-CM

## 2017-07-18 RX ORDER — OXYCODONE HYDROCHLORIDE 20 MG/1
20 TABLET ORAL EVERY 12 HOURS
Qty: 60 TABLET | Refills: 0 | Status: SHIPPED | OUTPATIENT
Start: 2017-07-18 | End: 2017-08-21 | Stop reason: SDUPTHER

## 2017-08-03 ENCOUNTER — CLINICAL SUPPORT (OUTPATIENT)
Dept: FAMILY MEDICINE CLINIC | Facility: CLINIC | Age: 77
End: 2017-08-03

## 2017-08-03 DIAGNOSIS — E53.8 B12 DEFICIENCY: ICD-10-CM

## 2017-08-03 PROCEDURE — 96372 THER/PROPH/DIAG INJ SC/IM: CPT | Performed by: FAMILY MEDICINE

## 2017-08-03 RX ADMIN — CYANOCOBALAMIN 1000 MCG: 1000 INJECTION, SOLUTION INTRAMUSCULAR; SUBCUTANEOUS at 15:04

## 2017-08-21 ENCOUNTER — TELEPHONE (OUTPATIENT)
Dept: FAMILY MEDICINE CLINIC | Facility: CLINIC | Age: 77
End: 2017-08-21

## 2017-08-21 DIAGNOSIS — M06.9 RHEUMATOID ARTHRITIS INVOLVING MULTIPLE SITES, UNSPECIFIED RHEUMATOID FACTOR PRESENCE: ICD-10-CM

## 2017-08-21 DIAGNOSIS — G89.4 CHRONIC PAIN SYNDROME: ICD-10-CM

## 2017-08-21 RX ORDER — OXYCODONE HYDROCHLORIDE 20 MG/1
20 TABLET ORAL EVERY 12 HOURS
Qty: 60 TABLET | Refills: 0 | Status: SHIPPED | OUTPATIENT
Start: 2017-08-21 | End: 2017-09-18 | Stop reason: SDUPTHER

## 2017-08-21 NOTE — TELEPHONE ENCOUNTER
----- Message from Rosemary Mendoza sent at 8/21/2017  8:35 AM EDT -----  Contact: NGA PEREA ON oxyCODONE (ROXICODONE) 20 MG tablet    [phone]  MESSAGE OK

## 2017-09-18 ENCOUNTER — OFFICE VISIT (OUTPATIENT)
Dept: FAMILY MEDICINE CLINIC | Facility: CLINIC | Age: 77
End: 2017-09-18

## 2017-09-18 VITALS
RESPIRATION RATE: 16 BRPM | DIASTOLIC BLOOD PRESSURE: 66 MMHG | SYSTOLIC BLOOD PRESSURE: 128 MMHG | HEART RATE: 60 BPM | BODY MASS INDEX: 35.24 KG/M2 | OXYGEN SATURATION: 92 % | HEIGHT: 60 IN | TEMPERATURE: 97.1 F | WEIGHT: 179.5 LBS

## 2017-09-18 DIAGNOSIS — N39.46 MIXED INCONTINENCE: ICD-10-CM

## 2017-09-18 DIAGNOSIS — M06.9 RHEUMATOID ARTHRITIS INVOLVING MULTIPLE SITES, UNSPECIFIED RHEUMATOID FACTOR PRESENCE: ICD-10-CM

## 2017-09-18 DIAGNOSIS — E53.8 B12 DEFICIENCY: ICD-10-CM

## 2017-09-18 DIAGNOSIS — G47.09 OTHER INSOMNIA: ICD-10-CM

## 2017-09-18 DIAGNOSIS — G89.4 CHRONIC PAIN SYNDROME: ICD-10-CM

## 2017-09-18 DIAGNOSIS — I10 BENIGN ESSENTIAL HYPERTENSION: Primary | ICD-10-CM

## 2017-09-18 PROCEDURE — 96372 THER/PROPH/DIAG INJ SC/IM: CPT | Performed by: FAMILY MEDICINE

## 2017-09-18 PROCEDURE — 99214 OFFICE O/P EST MOD 30 MIN: CPT | Performed by: FAMILY MEDICINE

## 2017-09-18 RX ORDER — OXYBUTYNIN CHLORIDE 5 MG/1
5 TABLET ORAL 2 TIMES DAILY
Qty: 60 TABLET | Refills: 2 | Status: SHIPPED | OUTPATIENT
Start: 2017-09-18 | End: 2017-12-18 | Stop reason: SDUPTHER

## 2017-09-18 RX ORDER — OXYCODONE HYDROCHLORIDE 20 MG/1
20 TABLET ORAL EVERY 12 HOURS
Qty: 60 TABLET | Refills: 0 | Status: SHIPPED | OUTPATIENT
Start: 2017-09-18 | End: 2017-10-24 | Stop reason: SDUPTHER

## 2017-09-18 RX ORDER — TRAZODONE HYDROCHLORIDE 100 MG/1
200 TABLET ORAL NIGHTLY
Qty: 60 TABLET | Refills: 5 | Status: SHIPPED | OUTPATIENT
Start: 2017-09-18 | End: 2018-05-17

## 2017-09-18 RX ADMIN — CYANOCOBALAMIN 1000 MCG: 1000 INJECTION, SOLUTION INTRAMUSCULAR; SUBCUTANEOUS at 16:36

## 2017-09-18 NOTE — ASSESSMENT & PLAN NOTE
Stable on trazodone.  I think she still is napping too much during the day which is causing issues at night.  Encouraged her not to nap during the day.  Continue the 2 trazodone only.

## 2017-09-18 NOTE — ASSESSMENT & PLAN NOTE
Continue follow-up with dermatology.  Chronic pain.  Oxycodone does help.  Denies misuse or diversion.

## 2017-09-18 NOTE — ASSESSMENT & PLAN NOTE
Hypertension is improving with treatment.  Continue current treatment regimen.  Stop smoking.  Continue current medications.  Blood pressure will be reassessed in 3 months.

## 2017-09-18 NOTE — PROGRESS NOTES
Assessment/Plan     Problem List Items Addressed This Visit        Cardiovascular and Mediastinum    Benign essential hypertension - Primary     Hypertension is improving with treatment.  Continue current treatment regimen.  Stop smoking.  Continue current medications.  Blood pressure will be reassessed in 3 months.            Musculoskeletal and Integument    Rheumatoid arthritis     Continue follow-up with dermatology.  Chronic pain.  Oxycodone does help.  Denies misuse or diversion.         Relevant Medications    oxyCODONE (ROXICODONE) 20 MG tablet       Hematopoietic and Hemostatic    Low vitamin B12 level     B12 shot today.  Doing well.  Last level was good.  Recheck in 3 months.            Other    Insomnia     Stable on trazodone.  I think she still is napping too much during the day which is causing issues at night.  Encouraged her not to nap during the day.  Continue the 2 trazodone only.         Relevant Medications    traZODone (DESYREL) 100 MG tablet      Other Visit Diagnoses     Mixed incontinence        Relevant Medications    oxybutynin (DITROPAN) 5 MG tablet    Chronic pain syndrome        Relevant Medications    oxyCODONE (ROXICODONE) 20 MG tablet           Follow up: Return in about 3 months (around 12/18/2017), or if symptoms worsen or fail to improve.     DISCUSSION  All other problems are stable at this time.  Because of the incontinence issue, we will try oxybutynin.  Side effects explained.  Call if not improving.      MEDICATIONS PRESCRIBED  Requested Prescriptions     Signed Prescriptions Disp Refills   • oxybutynin (DITROPAN) 5 MG tablet 60 tablet 2     Sig: Take 1 tablet by mouth 2 (Two) Times a Day.   • oxyCODONE (ROXICODONE) 20 MG tablet 60 tablet 0     Sig: Take 1 tablet by mouth Every 12 (Twelve) Hours.   • traZODone (DESYREL) 100 MG tablet 60 tablet 5     Sig: Take 2 tablets by mouth Every Night.        Obi dated on 9/27/2017  was reviewed and appropriate.         -------------------------------------------    Chief Complaint   Patient presents with   • Hypertension   • Hyperlipidemia   • Depression   • Med Refill   • Labs Only       Subjective         Hypertension   This is a chronic problem. The current episode started more than 1 year ago. The problem is unchanged. The problem is controlled. Associated symptoms include headaches (occ headache). Pertinent negatives include no chest pain, peripheral edema or shortness of breath. (Occ dizziness) There are no associated agents to hypertension. The current treatment provides moderate improvement. There is no history of angina or kidney disease. There is no history of chronic renal disease.   Hyperlipidemia   This is a chronic problem. The current episode started more than 1 year ago. The problem is controlled. Exacerbating diseases include diabetes. She has no history of chronic renal disease. Associated symptoms include myalgias. Pertinent negatives include no chest pain or shortness of breath. Current antihyperlipidemic treatment includes statins. The current treatment provides moderate improvement of lipids. Risk factors for coronary artery disease include hypertension.   Depression   Visit Type: follow-up  Patient presents with the following symptoms: depressed mood (at times), insomnia (decreased sleep at night. sleeps during the day) and nervousness/anxiety.  Patient is not experiencing: shortness of breath and suicidal ideas.  Severity: mild       Bladder leakage   at times.   leaks out. Wears depends and irritate her.   Goes to the bathroom and leaks after and cough and sneezing.   walks long distance    RA  increased shoulder pain . hurts to lift her shoulder up and radiates to arm  some pain in the left. Right is worse  + right rotator cuff tears    pain meds helps with hand and feet pain     Trigger finger right middle finger  getting more aggravated    Past Medical History,Medications, Allergies, and social  "history was reviewed.    Review of Systems   Constitutional: Positive for fatigue. Negative for unexpected weight change.   Respiratory: Negative.  Negative for shortness of breath.    Cardiovascular: Negative.  Negative for chest pain.   Gastrointestinal: Negative.    Genitourinary: Negative.         See history of present illness   Musculoskeletal: Positive for arthralgias, back pain, joint swelling and myalgias.   Neurological: Positive for headaches (occ headache).   Psychiatric/Behavioral: Negative for suicidal ideas. The patient is nervous/anxious and has insomnia (decreased sleep at night. sleeps during the day).        Objective     Vitals:    09/18/17 1441 09/18/17 1523   BP: 128/66    Pulse: (!) 45 60   Resp: 16    Temp: 97.1 °F (36.2 °C)    TempSrc: Temporal Artery     SpO2: 92%    Weight: 179 lb 8 oz (81.4 kg)    Height: 60\" (152.4 cm)         Physical Exam   Constitutional: She is oriented to person, place, and time. She appears well-developed and well-nourished.   HENT:   Head: Normocephalic and atraumatic.   Right Ear: Hearing and external ear normal.   Left Ear: Hearing and external ear normal.   Mouth/Throat: Oropharynx is clear and moist.   Eyes: Conjunctivae and EOM are normal. Pupils are equal, round, and reactive to light.   Cardiovascular: Normal rate, regular rhythm and normal heart sounds.  Exam reveals no friction rub.    No murmur heard.  Pulmonary/Chest: Effort normal and breath sounds normal. No respiratory distress. She has no wheezes. She has no rales.   Musculoskeletal: She exhibits no edema.        Right shoulder: She exhibits decreased range of motion.   Chronic changes of hands bilaterally consistent with rheumatoid arthritis.  No significant changes.   Neurological: She is alert and oriented to person, place, and time.   Skin: Skin is warm.   Psychiatric: She has a normal mood and affect. Her behavior is normal. Cognition and memory are normal.        Nursing note and vitals " reviewed.            Dick Blunt MD

## 2017-09-27 ENCOUNTER — TELEPHONE (OUTPATIENT)
Dept: FAMILY MEDICINE CLINIC | Facility: CLINIC | Age: 77
End: 2017-09-27

## 2017-09-27 DIAGNOSIS — Z01.00 DIABETIC EYE EXAM (HCC): ICD-10-CM

## 2017-09-27 DIAGNOSIS — E11.9 TYPE 2 DIABETES MELLITUS WITHOUT COMPLICATION, WITHOUT LONG-TERM CURRENT USE OF INSULIN (HCC): Primary | ICD-10-CM

## 2017-09-27 DIAGNOSIS — E11.9 DIABETIC EYE EXAM (HCC): ICD-10-CM

## 2017-09-27 NOTE — TELEPHONE ENCOUNTER
----- Message from Orquidea Perez sent at 9/27/2017  3:55 PM EDT -----  Contact: CARRASCO [facility]  PATIENT IS NEEDING HER ANNUAL [facility] REFERRAL FOR HER APPOINTMENT ON [phone] PLEASE ORDER AND SEND OVER

## 2017-09-28 DIAGNOSIS — F32.A DEPRESSION: ICD-10-CM

## 2017-09-28 RX ORDER — SIMVASTATIN 20 MG
TABLET ORAL
Qty: 90 TABLET | Refills: 0 | Status: SHIPPED | OUTPATIENT
Start: 2017-09-28 | End: 2017-12-18 | Stop reason: SDUPTHER

## 2017-09-28 RX ORDER — DULOXETIN HYDROCHLORIDE 60 MG/1
CAPSULE, DELAYED RELEASE ORAL
Qty: 90 CAPSULE | Refills: 1 | Status: SHIPPED | OUTPATIENT
Start: 2017-09-28 | End: 2018-04-16 | Stop reason: SDUPTHER

## 2017-09-28 RX ORDER — GABAPENTIN 800 MG/1
TABLET ORAL
Qty: 270 TABLET | Refills: 1 | Status: SHIPPED | OUTPATIENT
Start: 2017-09-28 | End: 2018-07-19 | Stop reason: SDUPTHER

## 2017-09-28 RX ORDER — POTASSIUM CHLORIDE 750 MG/1
TABLET, EXTENDED RELEASE ORAL
Qty: 360 TABLET | Refills: 1 | Status: SHIPPED | OUTPATIENT
Start: 2017-09-28 | End: 2018-04-16 | Stop reason: SDUPTHER

## 2017-09-28 NOTE — TELEPHONE ENCOUNTER
FAXED TO [facility] FOR PT PER DR CARRASCO.

## 2017-09-28 NOTE — TELEPHONE ENCOUNTER
Jolie at [facility] confirmed it is with Dr. Vides.  I would enter referral but unsure of diagnosis to use.

## 2017-09-28 NOTE — TELEPHONE ENCOUNTER
Please call or fax gabapentin 800 mg 1 3 times a day to [facility] mail order #[id number]+ one refill.

## 2017-10-24 ENCOUNTER — TELEPHONE (OUTPATIENT)
Dept: FAMILY MEDICINE CLINIC | Facility: CLINIC | Age: 77
End: 2017-10-24

## 2017-10-24 DIAGNOSIS — G89.4 CHRONIC PAIN SYNDROME: ICD-10-CM

## 2017-10-24 DIAGNOSIS — M06.9 RHEUMATOID ARTHRITIS INVOLVING MULTIPLE SITES, UNSPECIFIED RHEUMATOID FACTOR PRESENCE: ICD-10-CM

## 2017-10-24 RX ORDER — OXYCODONE HYDROCHLORIDE 20 MG/1
20 TABLET ORAL EVERY 12 HOURS
Qty: 60 TABLET | Refills: 0 | Status: SHIPPED | OUTPATIENT
Start: 2017-10-24 | End: 2017-11-20 | Stop reason: SDUPTHER

## 2017-10-24 NOTE — TELEPHONE ENCOUNTER
----- Message from Heather Mcnamara sent at 10/24/2017  8:08 AM EDT -----  Contact: DR. CARRASCO; MED REFILL   PT IS NEEDING A REFILL ON THE FOLLOWING MEDICATION     oxyCODONE (ROXICODONE) 20 MG tablet

## 2017-11-20 ENCOUNTER — CLINICAL SUPPORT (OUTPATIENT)
Dept: FAMILY MEDICINE CLINIC | Facility: CLINIC | Age: 77
End: 2017-11-20

## 2017-11-20 ENCOUNTER — TELEPHONE (OUTPATIENT)
Dept: FAMILY MEDICINE CLINIC | Facility: CLINIC | Age: 77
End: 2017-11-20

## 2017-11-20 DIAGNOSIS — G89.4 CHRONIC PAIN SYNDROME: ICD-10-CM

## 2017-11-20 DIAGNOSIS — E53.8 B12 DEFICIENCY: ICD-10-CM

## 2017-11-20 DIAGNOSIS — M06.9 RHEUMATOID ARTHRITIS INVOLVING MULTIPLE SITES, UNSPECIFIED RHEUMATOID FACTOR PRESENCE: ICD-10-CM

## 2017-11-20 PROCEDURE — 96372 THER/PROPH/DIAG INJ SC/IM: CPT | Performed by: FAMILY MEDICINE

## 2017-11-20 RX ORDER — OXYCODONE HYDROCHLORIDE 20 MG/1
20 TABLET ORAL EVERY 12 HOURS
Qty: 60 TABLET | Refills: 0 | Status: SHIPPED | OUTPATIENT
Start: 2017-11-20 | End: 2017-12-18 | Stop reason: SDUPTHER

## 2017-11-20 RX ADMIN — CYANOCOBALAMIN 1000 MCG: 1000 INJECTION, SOLUTION INTRAMUSCULAR; SUBCUTANEOUS at 16:54

## 2017-11-20 NOTE — TELEPHONE ENCOUNTER
----- Message from Heather Mcnamara sent at 11/20/2017  2:35 PM EST -----  Contact: dr. noonan; med refill   Pt is needing a refill on the oxycodone.       Call back   [phone]

## 2017-12-18 ENCOUNTER — OFFICE VISIT (OUTPATIENT)
Dept: FAMILY MEDICINE CLINIC | Facility: CLINIC | Age: 77
End: 2017-12-18

## 2017-12-18 VITALS
BODY MASS INDEX: 35.93 KG/M2 | RESPIRATION RATE: 16 BRPM | WEIGHT: 183 LBS | HEIGHT: 60 IN | OXYGEN SATURATION: 96 % | DIASTOLIC BLOOD PRESSURE: 60 MMHG | SYSTOLIC BLOOD PRESSURE: 132 MMHG | TEMPERATURE: 96.8 F | HEART RATE: 60 BPM

## 2017-12-18 DIAGNOSIS — M06.9 RHEUMATOID ARTHRITIS INVOLVING MULTIPLE SITES, UNSPECIFIED RHEUMATOID FACTOR PRESENCE: ICD-10-CM

## 2017-12-18 DIAGNOSIS — R19.7 DIARRHEA, UNSPECIFIED TYPE: ICD-10-CM

## 2017-12-18 DIAGNOSIS — N39.46 MIXED INCONTINENCE: ICD-10-CM

## 2017-12-18 DIAGNOSIS — E78.2 MIXED HYPERLIPIDEMIA: ICD-10-CM

## 2017-12-18 DIAGNOSIS — F41.9 ANXIETY: ICD-10-CM

## 2017-12-18 DIAGNOSIS — I10 BENIGN ESSENTIAL HYPERTENSION: Primary | ICD-10-CM

## 2017-12-18 DIAGNOSIS — E53.8 LOW VITAMIN B12 LEVEL: ICD-10-CM

## 2017-12-18 DIAGNOSIS — G89.4 CHRONIC PAIN SYNDROME: ICD-10-CM

## 2017-12-18 DIAGNOSIS — E11.9 TYPE 2 DIABETES MELLITUS WITHOUT COMPLICATION, WITHOUT LONG-TERM CURRENT USE OF INSULIN (HCC): ICD-10-CM

## 2017-12-18 LAB
ALBUMIN SERPL-MCNC: 4.1 G/DL (ref 3.2–4.8)
ALBUMIN/GLOB SERPL: 1.8 G/DL (ref 1.5–2.5)
ALP SERPL-CCNC: 66 U/L (ref 25–100)
ALT SERPL-CCNC: 13 U/L (ref 7–40)
AST SERPL-CCNC: 20 U/L (ref 0–33)
BASOPHILS # BLD AUTO: 0.05 10*3/MM3 (ref 0–0.2)
BASOPHILS NFR BLD AUTO: 0.6 % (ref 0–1)
BILIRUB SERPL-MCNC: 0.4 MG/DL (ref 0.3–1.2)
BUN SERPL-MCNC: 20 MG/DL (ref 9–23)
BUN/CREAT SERPL: 22.2 (ref 7–25)
CALCIUM SERPL-MCNC: 9.6 MG/DL (ref 8.7–10.4)
CHLORIDE SERPL-SCNC: 107 MMOL/L (ref 99–109)
CHOLEST SERPL-MCNC: 204 MG/DL (ref 0–200)
CHOLEST/HDLC SERPL: 3.92 {RATIO}
CO2 SERPL-SCNC: 32 MMOL/L (ref 20–31)
CREAT SERPL-MCNC: 0.9 MG/DL (ref 0.6–1.3)
EOSINOPHIL # BLD AUTO: 0.32 10*3/MM3 (ref 0–0.3)
EOSINOPHIL NFR BLD AUTO: 3.7 % (ref 0–3)
ERYTHROCYTE [DISTWIDTH] IN BLOOD BY AUTOMATED COUNT: 13.9 % (ref 11.3–14.5)
GFR SERPLBLD CREATININE-BSD FMLA CKD-EPI: 61 ML/MIN/1.73
GFR SERPLBLD CREATININE-BSD FMLA CKD-EPI: 74 ML/MIN/1.73
GLOBULIN SER CALC-MCNC: 2.3 GM/DL
GLUCOSE SERPL-MCNC: 84 MG/DL (ref 70–100)
HBA1C MFR BLD: 5.4 % (ref 4.8–5.6)
HCT VFR BLD AUTO: 42.5 % (ref 34.5–44)
HDLC SERPL-MCNC: 52 MG/DL (ref 40–60)
HGB BLD-MCNC: 14 G/DL (ref 11.5–15.5)
IMM GRANULOCYTES # BLD: 0.04 10*3/MM3 (ref 0–0.03)
IMM GRANULOCYTES NFR BLD: 0.5 % (ref 0–0.6)
LDLC SERPL CALC-MCNC: 101 MG/DL (ref 0–100)
LYMPHOCYTES # BLD AUTO: 2.89 10*3/MM3 (ref 0.6–4.8)
LYMPHOCYTES NFR BLD AUTO: 33.3 % (ref 24–44)
MCH RBC QN AUTO: 31.6 PG (ref 27–31)
MCHC RBC AUTO-ENTMCNC: 32.9 G/DL (ref 32–36)
MCV RBC AUTO: 95.9 FL (ref 80–99)
MONOCYTES # BLD AUTO: 0.88 10*3/MM3 (ref 0–1)
MONOCYTES NFR BLD AUTO: 10.1 % (ref 0–12)
NEUTROPHILS # BLD AUTO: 4.5 10*3/MM3 (ref 1.5–8.3)
NEUTROPHILS NFR BLD AUTO: 51.8 % (ref 41–71)
PLATELET # BLD AUTO: 182 10*3/MM3 (ref 150–450)
POTASSIUM SERPL-SCNC: 5.3 MMOL/L (ref 3.5–5.5)
PROT SERPL-MCNC: 6.4 G/DL (ref 5.7–8.2)
RBC # BLD AUTO: 4.43 10*6/MM3 (ref 3.89–5.14)
SODIUM SERPL-SCNC: 141 MMOL/L (ref 132–146)
TRIGL SERPL-MCNC: 257 MG/DL (ref 0–150)
VIT B12 SERPL-MCNC: 610 PG/ML (ref 211–911)
VLDLC SERPL CALC-MCNC: 51.4 MG/DL
WBC # BLD AUTO: 8.68 10*3/MM3 (ref 3.5–10.8)

## 2017-12-18 PROCEDURE — 96372 THER/PROPH/DIAG INJ SC/IM: CPT | Performed by: FAMILY MEDICINE

## 2017-12-18 PROCEDURE — 99214 OFFICE O/P EST MOD 30 MIN: CPT | Performed by: FAMILY MEDICINE

## 2017-12-18 RX ORDER — METOPROLOL TARTRATE 50 MG/1
50 TABLET, FILM COATED ORAL 2 TIMES DAILY
Qty: 180 TABLET | Refills: 1 | Status: SHIPPED | OUTPATIENT
Start: 2017-12-18 | End: 2018-06-18

## 2017-12-18 RX ORDER — AMLODIPINE BESYLATE 10 MG/1
10 TABLET ORAL DAILY
Qty: 90 TABLET | Refills: 1 | Status: SHIPPED | OUTPATIENT
Start: 2017-12-18 | End: 2018-07-19 | Stop reason: SDUPTHER

## 2017-12-18 RX ORDER — GLIPIZIDE 5 MG/1
5 TABLET, FILM COATED, EXTENDED RELEASE ORAL 2 TIMES DAILY
Qty: 180 TABLET | Refills: 1 | Status: SHIPPED | OUTPATIENT
Start: 2017-12-18 | End: 2018-02-14 | Stop reason: SDUPTHER

## 2017-12-18 RX ORDER — OXYCODONE HYDROCHLORIDE 20 MG/1
20 TABLET ORAL EVERY 12 HOURS
Qty: 60 TABLET | Refills: 0 | Status: SHIPPED | OUTPATIENT
Start: 2017-12-18 | End: 2018-01-22 | Stop reason: SDUPTHER

## 2017-12-18 RX ORDER — LISINOPRIL 20 MG/1
20 TABLET ORAL DAILY
Qty: 90 TABLET | Refills: 1 | Status: SHIPPED | OUTPATIENT
Start: 2017-12-18 | End: 2018-07-19 | Stop reason: SDUPTHER

## 2017-12-18 RX ORDER — HYDROXYZINE PAMOATE 25 MG/1
25 CAPSULE ORAL 2 TIMES DAILY
Qty: 180 CAPSULE | Refills: 1 | Status: SHIPPED | OUTPATIENT
Start: 2017-12-18 | End: 2018-07-05 | Stop reason: SDUPTHER

## 2017-12-18 RX ORDER — OXYBUTYNIN CHLORIDE 5 MG/1
5 TABLET ORAL 2 TIMES DAILY
Qty: 60 TABLET | Refills: 2 | Status: SHIPPED | OUTPATIENT
Start: 2017-12-18 | End: 2018-04-02 | Stop reason: SDUPTHER

## 2017-12-18 RX ORDER — SIMVASTATIN 20 MG
20 TABLET ORAL NIGHTLY
Qty: 90 TABLET | Refills: 1 | Status: SHIPPED | OUTPATIENT
Start: 2017-12-18 | End: 2018-07-19 | Stop reason: SDUPTHER

## 2017-12-18 RX ADMIN — CYANOCOBALAMIN 1000 MCG: 1000 INJECTION, SOLUTION INTRAMUSCULAR; SUBCUTANEOUS at 15:57

## 2017-12-18 NOTE — PROGRESS NOTES
Assessment/Plan     Problem List Items Addressed This Visit        Cardiovascular and Mediastinum    Benign essential hypertension - Primary    Relevant Orders    Comprehensive Metabolic Panel    Lipid Panel With / Chol / HDL Ratio    Hyperlipidemia    Relevant Orders    Comprehensive Metabolic Panel    Lipid Panel With / Chol / HDL Ratio       Endocrine    Type 2 diabetes mellitus without complication, without long-term current use of insulin    Relevant Orders    Hemoglobin A1c       Musculoskeletal and Integument    Rheumatoid arthritis    Relevant Medications    oxyCODONE (ROXICODONE) 20 MG tablet       Hematopoietic and Hemostatic    Low vitamin B12 level    Relevant Orders    CBC & Differential    Vitamin B12      Other Visit Diagnoses     Chronic pain syndrome        Relevant Medications    oxyCODONE (ROXICODONE) 20 MG tablet    Mixed incontinence        Relevant Medications    oxybutynin (DITROPAN) 5 MG tablet    Diarrhea, unspecified type               Follow up: Return in about 3 months (around 3/18/2018), or if symptoms worsen or fail to improve.     DISCUSSION  Hypertension.  Stable.  Continue medication.    Diabetes mellitus type 2.  Check A1c.    Chronic pain and rheumatoid arthritis.  Refilled oxycodone.  Helps with pain and denies misuse or diversion.    B12 level.  Recheck B12 level and was given a shot after that.    Incontinence.  Oxybutynin does help but not quite as good as she wants but she is having a little dizziness so will not change dose    Hyperlipidemia.  Recheck level.    Diarrhea.  No clear cause.  We will check labs as noted.    Refilled medications as noted.      MEDICATIONS PRESCRIBED  Requested Prescriptions     Signed Prescriptions Disp Refills   • oxyCODONE (ROXICODONE) 20 MG tablet 60 tablet 0     Sig: Take 1 tablet by mouth Every 12 (Twelve) Hours.   • oxybutynin (DITROPAN) 5 MG tablet 60 tablet 2     Sig: Take 1 tablet by mouth 2 (Two) Times a Day.            Obi dated on  11/20/2017  was reviewed and appropriate.        -------------------------------------------    Subjective     Chief Complaint   Patient presents with   • Hypertension     3 month follow up   • Hyperlipidemia   • Diabetes   • Med Refill       Hypertension   This is a chronic problem. The current episode started more than 1 year ago. The problem is unchanged. The problem is controlled. Pertinent negatives include no chest pain, malaise/fatigue, peripheral edema or shortness of breath. (Occ dizziness) There are no associated agents to hypertension. The current treatment provides moderate improvement. There are no compliance problems.  There is no history of angina, kidney disease or CAD/MI. There is no history of chronic renal disease.   Hyperlipidemia   This is a chronic problem. The current episode started more than 1 year ago. The problem is controlled. Recent lipid tests were reviewed and are normal. She has no history of chronic renal disease. Pertinent negatives include no chest pain or shortness of breath. Current antihyperlipidemic treatment includes statins. The current treatment provides moderate improvement of lipids. There are no compliance problems.  Risk factors for coronary artery disease include dyslipidemia and hypertension.   Diabetes   She presents for her follow-up diabetic visit. She has type 2 diabetes mellitus. Her disease course has been stable. Hypoglycemia symptoms include nervousness/anxiousness. Pertinent negatives for diabetes include no chest pain. Her weight is stable. She is following a generally unhealthy diet. Home blood sugar record trend: does not check sugars. An ACE inhibitor/angiotensin II receptor blocker is being taken.   Depression   Visit Type: follow-up  Patient presents with the following symptoms: depressed mood (has been down recently,), insomnia (naps during the day, sleeps at night) and nervousness/anxiety.  Patient is not experiencing: shortness of breath and suicidal  "ideas.  Severity: mild         Diarrhea  X 5 days, watery. reoccurring issues  No fever. No blood in stool.   Hemorrhoid issues with this    No abd pain  Gas pain at times    Chronic Pain   + RA  No change  On pain med and helps her function  No side effects  Out of methotrexate and the RA MD will not refill til seen     Dizziness and off balance at times  Has been out of methotrexate.     Past Medical History,Medications, Allergies, and social history was reviewed.    Review of Systems   Constitutional: Negative for malaise/fatigue.   Respiratory: Negative for shortness of breath.    Cardiovascular: Negative for chest pain.   Psychiatric/Behavioral: Negative for suicidal ideas. The patient is nervous/anxious and has insomnia (naps during the day, sleeps at night).        Objective     Vitals:    12/18/17 1531 12/18/17 1614   BP: 132/60    Pulse: (!) 47 60   Resp: 16    Temp: 96.8 °F (36 °C)    TempSrc: Temporal Artery     SpO2: 96%    Weight: 83 kg (183 lb)    Height: 152.4 cm (60\")         Physical Exam   Constitutional: She is oriented to person, place, and time. She appears well-developed and well-nourished.   HENT:   Head: Normocephalic and atraumatic.   Right Ear: Hearing and external ear normal.   Left Ear: Hearing and external ear normal.   Mouth/Throat: Oropharynx is clear and moist.   Eyes: Conjunctivae and EOM are normal. Pupils are equal, round, and reactive to light.   Cardiovascular: Normal rate, regular rhythm and normal heart sounds.  Exam reveals no friction rub.    No murmur heard.  Pulmonary/Chest: Effort normal and breath sounds normal. No respiratory distress. She has no wheezes. She has no rales.   Abdominal: Soft. Bowel sounds are normal. She exhibits no distension. There is no tenderness.   Musculoskeletal: She exhibits no edema.   Chronic changes of hands bilaterally consistent with rheumatoid arthritis.  No significant changes.   Neurological: She is alert and oriented to person, place, and " time.   Skin: Skin is warm.   Psychiatric: She has a normal mood and affect. Her behavior is normal. Cognition and memory are normal.        Nursing note and vitals reviewed.              Dick Blunt MD

## 2018-01-22 ENCOUNTER — TELEPHONE (OUTPATIENT)
Dept: FAMILY MEDICINE CLINIC | Facility: CLINIC | Age: 78
End: 2018-01-22

## 2018-01-22 DIAGNOSIS — M06.9 RHEUMATOID ARTHRITIS INVOLVING MULTIPLE SITES, UNSPECIFIED RHEUMATOID FACTOR PRESENCE: ICD-10-CM

## 2018-01-22 DIAGNOSIS — G89.4 CHRONIC PAIN SYNDROME: ICD-10-CM

## 2018-01-22 RX ORDER — OXYCODONE HYDROCHLORIDE 20 MG/1
20 TABLET ORAL EVERY 12 HOURS
Qty: 60 TABLET | Refills: 0 | Status: SHIPPED | OUTPATIENT
Start: 2018-01-22 | End: 2018-02-26 | Stop reason: SDUPTHER

## 2018-01-22 NOTE — TELEPHONE ENCOUNTER
----- Message from Fuad Rutherford sent at 1/22/2018  3:09 PM EST -----  Contact: DANILO / PT CALL  PT CALLING TO REQUEST REFILL ON     oxyCODONE (ROXICODONE) 20 MG tablet [[id number]]   Order Details   Dose: 20 mg Route: Oral Frequency: Every 12 Hours  Dispense Quantity:  60 tablet Refills:  0 Fills Remaining:  --         Sig: Take 1 tablet by mouth Every 12 (Twelve) Hours.          PT CALL BACK [phone]

## 2018-02-14 DIAGNOSIS — E11.9 TYPE 2 DIABETES MELLITUS WITHOUT COMPLICATION, WITHOUT LONG-TERM CURRENT USE OF INSULIN (HCC): ICD-10-CM

## 2018-02-14 RX ORDER — GLIPIZIDE 5 MG/1
5 TABLET, FILM COATED, EXTENDED RELEASE ORAL DAILY
Qty: 180 TABLET | Refills: 1 | Status: SHIPPED | OUTPATIENT
Start: 2018-02-14 | End: 2018-06-14

## 2018-02-14 NOTE — TELEPHONE ENCOUNTER
Called in gralise 600 mg take one 3 times daily dispense 90 no refills for a trial. This was sent to [facility]. [facility] inform pt not to take gabapentin and gralise together. LVM FOR WILLARD TO RETURN CALL AND OFFICE HRS AND NUMBER GIVEN.

## 2018-02-19 ENCOUNTER — TELEPHONE (OUTPATIENT)
Dept: FAMILY MEDICINE CLINIC | Facility: CLINIC | Age: 78
End: 2018-02-19

## 2018-02-19 NOTE — TELEPHONE ENCOUNTER
----- Message from Rosemary Christensen sent at 2/19/2018  9:00 AM EST -----  Contact: MONICA  HER CARETAKER CALLED BACK AND SAID [place] IS IN THE HOSP[facility] AT [facility] IN THE EMERGENCY ROOM.  YOU CAN CALL HER BACK [phone]

## 2018-02-19 NOTE — TELEPHONE ENCOUNTER
SPOKE WITH PT CARE GIVER WILLARD AND INFORMED HER OF THE GRALISE IS TO EXPENSIVE AND TO SEE IF SHE WANTED TO CANCEL THAT AND STAY WITH GABAPENTIN. WILLARD DOES WANT TO STAY WITH GABAPENTIN AND SHE STATES THAT PT WAS IN ER DUE TO BEING VERY CONFUSED AND TOOK OFF IN HER CAR SAYING SHE WAS LOOKING FOR A GENTLEMAN. PT ENDED UP AT POST OFFICE IN [facility]. SOME HOW THE POLICE WAS CALLED INTO THIS AND THEY NOTICED HOW CONFUSED PT WAS AND SHE WAS TAKEN TO ER. TO BE EVALUATED. ER  KEPT TELLING WILLARD PT DIDN'T NEED TO BE LIVING ALONE. ER  CALLED IN  AND THEY INFORMED WILLARD THAT SHE WAS RESPONSIBLE FOR PT AND PT IS TRYING TO GET INTO [facility] AS THEY HAVE A BED AVAIL NOW BUT WE HAVE TO MAKE SURE PT ISN'T TO CONFUSED TO BE THERE. CALLED [facility] AND CANCELLED GRALISE AND CALLED IN GABAPENTIN 800 MG ONE 3 TIMES DAILY AND DISPENSE 270.

## 2018-02-26 ENCOUNTER — TELEPHONE (OUTPATIENT)
Dept: FAMILY MEDICINE CLINIC | Facility: CLINIC | Age: 78
End: 2018-02-26

## 2018-02-26 DIAGNOSIS — M06.9 RHEUMATOID ARTHRITIS INVOLVING MULTIPLE SITES, UNSPECIFIED RHEUMATOID FACTOR PRESENCE: ICD-10-CM

## 2018-02-26 DIAGNOSIS — G89.4 CHRONIC PAIN SYNDROME: ICD-10-CM

## 2018-02-26 RX ORDER — OXYCODONE HYDROCHLORIDE 20 MG/1
20 TABLET ORAL EVERY 12 HOURS
Qty: 60 TABLET | Refills: 0 | Status: SHIPPED | OUTPATIENT
Start: 2018-02-26 | End: 2018-03-19 | Stop reason: SDUPTHER

## 2018-02-26 NOTE — TELEPHONE ENCOUNTER
----- Message from Arianne Syed sent at 2/26/2018  9:49 AM EST -----  Contact: Blunt  Patient is needing a refill on Oxycodone 20mg. Please call patient when ready at [phone].

## 2018-02-26 NOTE — TELEPHONE ENCOUNTER
Please call her niece.  We received a refill request for the oxycodone.  Just need to find out what happened with the ER visit before refilling this.  There have been no mention about her possibly going to a nursing home.

## 2018-02-26 NOTE — TELEPHONE ENCOUNTER
SPOKE WITH NIECE WILLARD AND NOTHING COME OF ER VISIT WITH PT OTHER THAN THEM INFORMING HER SHE ISN'T ABLE TO LIVE ALONE. PT IS NOW AT [facility] AND THAT IS WHERE SHE WILL STAY. PT DOES NEED A REFILL ON THIS.

## 2018-03-19 ENCOUNTER — OFFICE VISIT (OUTPATIENT)
Dept: FAMILY MEDICINE CLINIC | Facility: CLINIC | Age: 78
End: 2018-03-19

## 2018-03-19 VITALS
RESPIRATION RATE: 16 BRPM | TEMPERATURE: 97.8 F | BODY MASS INDEX: 35.14 KG/M2 | SYSTOLIC BLOOD PRESSURE: 130 MMHG | DIASTOLIC BLOOD PRESSURE: 64 MMHG | HEIGHT: 60 IN | HEART RATE: 50 BPM | OXYGEN SATURATION: 95 % | WEIGHT: 179 LBS

## 2018-03-19 DIAGNOSIS — F34.1 DYSTHYMIA: ICD-10-CM

## 2018-03-19 DIAGNOSIS — M06.9 RHEUMATOID ARTHRITIS INVOLVING MULTIPLE SITES, UNSPECIFIED RHEUMATOID FACTOR PRESENCE: ICD-10-CM

## 2018-03-19 DIAGNOSIS — G89.4 CHRONIC PAIN SYNDROME: ICD-10-CM

## 2018-03-19 DIAGNOSIS — E53.8 LOW VITAMIN B12 LEVEL: ICD-10-CM

## 2018-03-19 DIAGNOSIS — E11.9 TYPE 2 DIABETES MELLITUS WITHOUT COMPLICATION, WITHOUT LONG-TERM CURRENT USE OF INSULIN (HCC): ICD-10-CM

## 2018-03-19 DIAGNOSIS — I10 BENIGN ESSENTIAL HYPERTENSION: Primary | ICD-10-CM

## 2018-03-19 PROCEDURE — 99214 OFFICE O/P EST MOD 30 MIN: CPT | Performed by: FAMILY MEDICINE

## 2018-03-19 PROCEDURE — 96372 THER/PROPH/DIAG INJ SC/IM: CPT | Performed by: FAMILY MEDICINE

## 2018-03-19 RX ORDER — OXYCODONE HYDROCHLORIDE 20 MG/1
20 TABLET ORAL EVERY 12 HOURS
Qty: 60 TABLET | Refills: 0 | Status: SHIPPED | OUTPATIENT
Start: 2018-03-19 | End: 2018-04-30 | Stop reason: SDUPTHER

## 2018-03-19 RX ADMIN — CYANOCOBALAMIN 1000 MCG: 1000 INJECTION, SOLUTION INTRAMUSCULAR; SUBCUTANEOUS at 15:36

## 2018-03-19 NOTE — PROGRESS NOTES
Assessment/Plan       Problems Addressed this Visit        Cardiovascular and Mediastinum    Benign essential hypertension - Primary       Endocrine    Type 2 diabetes mellitus without complication, without long-term current use of insulin       Musculoskeletal and Integument    Rheumatoid arthritis    Relevant Medications    oxyCODONE (ROXICODONE) 20 MG tablet       Hematopoietic and Hemostatic    Low vitamin B12 level       Other    Depression    Chronic pain syndrome    Relevant Medications    oxyCODONE (ROXICODONE) 20 MG tablet      Other Visit Diagnoses    None.           Follow up: Return in about 3 months (around 6/19/2018).     DISCUSSION  Recent significant changes in her life.  She recently moved to assisted living.  She is also stop smoking.  Has issues with tearfulness at times.  I removed 2 weeks ago so I think she still having some grief reaction to moving.  If not improving over the next several weeks, they are to let me know.    Hypertension.  Stable.  Continue medication.    Diabetes mellitus type 2.  Last A1c was great.  Continue diet changes.    Rheumatoid arthritis with chronic pain.  Refilled oxycodone.  Medication is being administered at assisted living now.    Follow-up in 3 months for recheck or sooner with problems.          MEDICATIONS PRESCRIBED  Requested Prescriptions     Signed Prescriptions Disp Refills   • oxyCODONE (ROXICODONE) 20 MG tablet 60 tablet 0     Sig: Take 1 tablet by mouth Every 12 (Twelve) Hours.            Obi dated on 3/19/2018   was reviewed and appropriate.        -------------------------------------------    Subjective     Chief Complaint   Patient presents with   • Hypertension     3 month follow up   • Anxiety   • Hyperlipidemia   • Diabetes   • Med Refill         Hypertension   This is a chronic problem. The current episode started more than 1 year ago. The problem is unchanged. The problem is controlled. Associated symptoms include anxiety. Risk factors  for coronary artery disease include dyslipidemia. Past treatments include ACE inhibitors. The current treatment provides moderate improvement. There are no compliance problems.  There is no history of angina, kidney disease or CAD/MI. There is no history of chronic renal disease. Current antihypertension treatment includes ACE inhibitors.   Anxiety   Presents for follow-up visit. Symptoms include confusion, depressed mood (tearful at times. Bored. ) and nervous/anxious behavior. Symptoms occur most days. The severity of symptoms is moderate. The quality of sleep is fair (takes her meds later and they try to give meds earlier. ).       Diabetes   She presents for her follow-up diabetic visit. She has type 2 diabetes mellitus. Her disease course has been stable. Hypoglycemia symptoms include confusion and nervousness/anxiousness. Symptoms are stable. She is compliant with treatment all of the time. Her weight is stable. She is following a generally healthy diet. An ACE inhibitor/angiotensin II receptor blocker is being taken.       Memory loss    Sudden changes with home change  At [facility]. Trying to do what she is telling her  + confused at times  They administer meds and she does not have to do that  Confused at times  Up and down  + upset about this    Treating her well    No smoking now  Nicoderm patch, starting with 23   Does not miss     Noris states get the shakes a lot.   Tearful at times    Had fallen and hurt left hip/lower back          Past Medical History,Medications, Allergies, and social history was reviewed.      Review of Systems   Constitutional: Negative.    HENT: Negative.    Respiratory: Negative.    Cardiovascular: Negative.    Gastrointestinal: Negative.    Musculoskeletal: Positive for arthralgias and back pain (chronic pain ).   Neurological: Positive for confusion.   Psychiatric/Behavioral: Positive for depressed mood (tearful at times. Bored. ). The patient is nervous/anxious.   "      Objective     Vitals:    03/19/18 1457   BP: 130/64   Pulse: 50   Resp: 16   Temp: 97.8 °F (36.6 °C)   TempSrc: Temporal Artery    SpO2: 95%   Weight: 81.2 kg (179 lb)   Height: 152.4 cm (60\")          Physical Exam   Constitutional: She is oriented to person, place, and time. She appears well-developed and well-nourished. No distress.   HENT:   Head: Normocephalic.   Right Ear: Hearing, tympanic membrane, external ear and ear canal normal.   Left Ear: Hearing, tympanic membrane, external ear and ear canal normal.   Nose: Nose normal.   Mouth/Throat: Oropharynx is clear and moist. No oropharyngeal exudate.   Eyes: Conjunctivae and EOM are normal. Pupils are equal, round, and reactive to light.   Neck: Normal range of motion. Neck supple. No thyromegaly present.   Cardiovascular: Normal rate, regular rhythm and normal heart sounds.  Exam reveals no gallop and no friction rub.    No murmur heard.  Pulmonary/Chest: Effort normal and breath sounds normal. No respiratory distress. She has no wheezes. She has no rales.   Abdominal: Soft. Bowel sounds are normal. She exhibits no distension and no mass. There is no tenderness. There is no rebound and no guarding.   Musculoskeletal: She exhibits no edema.   Lymphadenopathy:     She has no cervical adenopathy.   Neurological: She is alert and oriented to person, place, and time.   Skin: Skin is warm. She is not diaphoretic.   Psychiatric: Her behavior is normal. She exhibits a depressed mood.   tearful   Nursing note and vitals reviewed.              Dick Blunt MD    "

## 2018-04-02 DIAGNOSIS — N39.46 MIXED INCONTINENCE: ICD-10-CM

## 2018-04-02 RX ORDER — OXYBUTYNIN CHLORIDE 5 MG/1
TABLET ORAL
Qty: 60 TABLET | Refills: 1 | Status: SHIPPED | OUTPATIENT
Start: 2018-04-02 | End: 2018-05-11 | Stop reason: SDUPTHER

## 2018-04-16 DIAGNOSIS — F32.A DEPRESSION: ICD-10-CM

## 2018-04-16 RX ORDER — POTASSIUM CHLORIDE 750 MG/1
TABLET, EXTENDED RELEASE ORAL
Qty: 360 TABLET | Refills: 0 | Status: SHIPPED | OUTPATIENT
Start: 2018-04-16 | End: 2018-07-19 | Stop reason: SDUPTHER

## 2018-04-16 RX ORDER — DULOXETIN HYDROCHLORIDE 60 MG/1
CAPSULE, DELAYED RELEASE ORAL
Qty: 90 CAPSULE | Refills: 0 | Status: SHIPPED | OUTPATIENT
Start: 2018-04-16 | End: 2018-07-19 | Stop reason: SDUPTHER

## 2018-04-23 ENCOUNTER — TELEPHONE (OUTPATIENT)
Dept: FAMILY MEDICINE CLINIC | Facility: CLINIC | Age: 78
End: 2018-04-23

## 2018-04-23 NOTE — TELEPHONE ENCOUNTER
----- Message from Rosemary Mendoza sent at 4/23/2018  3:11 PM EDT -----  Contact[facility]  PT IS AT [facility] AND THEY ARE REQUESTING SKILLED NURSING  FOR MEDS MANAGEMENT-PT SEEMS TO BE ACTING OUT AND DO NOT KNOW  REASON WHY? DAUGHTER IS OK WITH THIS    [id number]-[phone]

## 2018-04-26 ENCOUNTER — TELEPHONE (OUTPATIENT)
Dept: FAMILY MEDICINE CLINIC | Facility: CLINIC | Age: 78
End: 2018-04-26

## 2018-04-26 NOTE — TELEPHONE ENCOUNTER
Please call, when will they be checking her again? Her. HR was 50 in office last time. If becomes symptomatic, she needs to go to ER. She is on Metoprolol and may be causing the decreased HR. Rec OV here next week to check as well. bds

## 2018-04-26 NOTE — TELEPHONE ENCOUNTER
----- Message from Heather Mcnamara sent at 4/26/2018  2:17 PM EDT -----  Contact: DANILO; PT UPDATE   Pt has been admitted to home health for a visit 1 time a week. Leif.     She stated that today when she went to see her, her heart rate was 42 but the pt was not in any distress at all. It was confirmed on both arms on two different occassions.       Best call back   [phone]  PAUL PRINCE NURSE

## 2018-04-27 NOTE — TELEPHONE ENCOUNTER
LM for pt to call us back (to inform her Dr. Blunt wants to see her next week) & LM for Dana to call us back too.

## 2018-04-30 ENCOUNTER — TELEPHONE (OUTPATIENT)
Dept: FAMILY MEDICINE CLINIC | Facility: CLINIC | Age: 78
End: 2018-04-30

## 2018-04-30 DIAGNOSIS — G89.4 CHRONIC PAIN SYNDROME: ICD-10-CM

## 2018-04-30 DIAGNOSIS — M06.9 RHEUMATOID ARTHRITIS INVOLVING MULTIPLE SITES, UNSPECIFIED RHEUMATOID FACTOR PRESENCE: ICD-10-CM

## 2018-04-30 RX ORDER — OXYCODONE HYDROCHLORIDE 20 MG/1
20 TABLET ORAL EVERY 12 HOURS
Qty: 60 TABLET | Refills: 0 | Status: SHIPPED | OUTPATIENT
Start: 2018-04-30 | End: 2018-05-17

## 2018-04-30 NOTE — TELEPHONE ENCOUNTER
Please call and confirm which local pharmacy they are using.  We can now send this to the pharmacy directly.

## 2018-04-30 NOTE — TELEPHONE ENCOUNTER
Informed pt's niece that Ms. Coburn needs to be seen here this week to fu on her heart rate. She was transferred to the front to [facility] appt.

## 2018-04-30 NOTE — TELEPHONE ENCOUNTER
----- Message from Arianne Syed sent at 4/30/2018  8:22 AM EDT -----  Contact: Smith  Patient is needing a refill on Oxycodone 20mg. Please call patient at [phone].

## 2018-05-01 ENCOUNTER — TELEPHONE (OUTPATIENT)
Dept: FAMILY MEDICINE CLINIC | Facility: CLINIC | Age: 78
End: 2018-05-01

## 2018-05-01 NOTE — TELEPHONE ENCOUNTER
PAUL WITH [facility] N[facility]SE CALLING BC SHE IS VERY CONCERNED. PT HAS SOME MENTAL STATUS, PT IS VULG[employer] LANGUAGE. PT REFUSES TO GO TO ER AND I STOPPED PAUL AND SPOKE WITH DR CARRASCO AND HE BEGAN TALKING TO HER.

## 2018-05-03 ENCOUNTER — TELEPHONE (OUTPATIENT)
Dept: FAMILY MEDICINE CLINIC | Facility: CLINIC | Age: 78
End: 2018-05-03

## 2018-05-03 NOTE — TELEPHONE ENCOUNTER
Please call patient's niece and see how she is doing.  I had received a call from the nurse on Tuesday that she was not doing well.  Did she get evaluated outside of [facility]?

## 2018-05-04 NOTE — TELEPHONE ENCOUNTER
She did get transferred out and the nurse mgr told me any further information regarding her status since she has left would have to come from pt's niece.

## 2018-05-11 DIAGNOSIS — N39.46 MIXED INCONTINENCE: ICD-10-CM

## 2018-05-14 RX ORDER — OXYBUTYNIN CHLORIDE 5 MG/1
TABLET ORAL
Qty: 60 TABLET | Refills: 0 | Status: SHIPPED | OUTPATIENT
Start: 2018-05-14 | End: 2018-07-19 | Stop reason: SDUPTHER

## 2018-05-16 ENCOUNTER — TELEPHONE (OUTPATIENT)
Dept: FAMILY MEDICINE CLINIC | Facility: CLINIC | Age: 78
End: 2018-05-16

## 2018-05-16 NOTE — TELEPHONE ENCOUNTER
Spoke w/ Dana and gave verbal okay for UA and she said, disregard the first message they found skilled need to keep pt under their care.

## 2018-05-16 NOTE — TELEPHONE ENCOUNTER
----- Message from Heather Mcnamara sent at 5/16/2018  2:01 PM EDT -----  Contact: NGA FRITZ   [facility] CALLED IN REQUESTING TO BE ABLE TO DO A URINE SAMPLE. PT IS STATING BURNING IN VAGINAL AREA/ FREQUENT URINATION. VERBAL ORDER IS NEEDED TO CHECK FOR A UTI.       CALL BACK   [phone]  PAUL - NURSE WITH HOME HEALTH     PLEASE CALL BACK SOON

## 2018-05-16 NOTE — TELEPHONE ENCOUNTER
PAUL FROM [facility] AND THEY DON'T HAVE A PSYCH. NURSE ON STAFF AND THEY AREN'T ABLE TO HELP WITH THAT AND PAUL NEEDS TO SEE IF YOU WANT PSYCH NURSING OR IF SHE NEEDS TO BE SEEN FOR SOMETHING ELSE.PT WAS IN [facility]  AND WAS DISCHARGED.  IF YOU NEED PSYCH THEY WILL HAVE TO DISCHARGE HER. PLEASE ADVISE             PAUL [phone]

## 2018-05-17 ENCOUNTER — TELEPHONE (OUTPATIENT)
Dept: FAMILY MEDICINE CLINIC | Facility: CLINIC | Age: 78
End: 2018-05-17

## 2018-05-17 RX ORDER — DONEPEZIL HYDROCHLORIDE 10 MG/1
10 TABLET, FILM COATED ORAL NIGHTLY
Qty: 30 TABLET | Refills: 0 | COMMUNITY
Start: 2018-05-17 | End: 2018-05-25 | Stop reason: SDUPTHER

## 2018-05-17 RX ORDER — OXYCODONE AND ACETAMINOPHEN 7.5; 325 MG/1; MG/1
1 TABLET ORAL EVERY 6 HOURS PRN
Qty: 1 TABLET | Refills: 0 | COMMUNITY
Start: 2018-05-17 | End: 2018-05-25

## 2018-05-17 RX ORDER — TRAZODONE HYDROCHLORIDE 50 MG/1
TABLET ORAL
Qty: 15 TABLET | Refills: 0 | COMMUNITY
Start: 2018-05-17 | End: 2018-07-19 | Stop reason: SDUPTHER

## 2018-05-17 RX ORDER — DIVALPROEX SODIUM 250 MG/1
250 TABLET, DELAYED RELEASE ORAL 3 TIMES DAILY
Qty: 90 TABLET | Refills: 0 | COMMUNITY
Start: 2018-05-17 | End: 2018-05-25

## 2018-05-17 RX ORDER — MEMANTINE HYDROCHLORIDE 14 MG/1
14 CAPSULE, EXTENDED RELEASE ORAL DAILY
Qty: 30 CAPSULE | Refills: 0 | COMMUNITY
Start: 2018-05-17 | End: 2018-05-25 | Stop reason: SDUPTHER

## 2018-05-17 RX ORDER — QUETIAPINE FUMARATE 300 MG/1
300 TABLET, FILM COATED ORAL NIGHTLY
Qty: 30 TABLET | Refills: 0 | COMMUNITY
Start: 2018-05-17 | End: 2018-05-25 | Stop reason: SDUPTHER

## 2018-05-17 RX ORDER — LIDOCAINE 50 MG/G
1 PATCH TOPICAL EVERY 24 HOURS
COMMUNITY
Start: 2018-05-17 | End: 2018-05-25

## 2018-05-17 RX ORDER — FLUCONAZOLE 150 MG/1
TABLET ORAL
Qty: 2 TABLET | Refills: 0 | Status: SHIPPED | OUTPATIENT
Start: 2018-05-17 | End: 2018-05-25

## 2018-05-17 NOTE — TELEPHONE ENCOUNTER
Please call nurse back.  The urinalysis only showed trace bacteria but the leukocyte esterase and nitrates were negative and she did have a few epithelial cells.  May have been a contamination.  If symptoms are persisting, recommend recheck UA.

## 2018-05-17 NOTE — TELEPHONE ENCOUNTER
Please call.  We need to confirm her current medications.  I believe that they placed her on some different medications and Diflucan could possibly interact with those.

## 2018-05-17 NOTE — TELEPHONE ENCOUNTER
SPOKE WITH PAUL AND ADDED ALL NEW MEDS TO PT CHART AND SENT TO DR CARRASCO IN ANOTHER MESSAGE AND HE HAS SENT THE DIFLUCAN IN FOR PT AND NO INTERACTIONS.   SPOKE WITH PAUL AND INFORMED HER OF THIS

## 2018-05-22 ENCOUNTER — TELEPHONE (OUTPATIENT)
Dept: FAMILY MEDICINE CLINIC | Facility: CLINIC | Age: 78
End: 2018-05-22

## 2018-05-22 NOTE — TELEPHONE ENCOUNTER
SPOKE WITH DANIEL AND INFORMED HER THAT UA WAS OK AND SHE IS ALSO WANTING YOU TO KNOW THAT PT HR WAS 58 AND SHE USUALLY RUNS 60 AND UNDER AND SHE IS ASKING FOR A VERBAL TO CHANGE HER PARAMETER SO SHE DOESN'T HAVE TO CALL WHEN IT IS UNDER 60. CHANGE IT  PLEASE ADVISE.

## 2018-05-22 NOTE — TELEPHONE ENCOUNTER
----- Message from Rosemary Christensen sent at 5/22/2018 10:12 AM EDT -----  Contact: MONICA CHAN WITH [facility] CALLING TO GET A VERBAL ORDER TO DO ANOTHER UA FOR PATIENT BECAUSE SHE IS STILL HAVING DISCOMFORT AND THE LAST ONE SHOWED CONTAMINATION.     DUSTIN  [phone]

## 2018-05-25 ENCOUNTER — OFFICE VISIT (OUTPATIENT)
Dept: FAMILY MEDICINE CLINIC | Facility: CLINIC | Age: 78
End: 2018-05-25

## 2018-05-25 VITALS
WEIGHT: 190 LBS | HEIGHT: 60 IN | TEMPERATURE: 97.5 F | DIASTOLIC BLOOD PRESSURE: 58 MMHG | RESPIRATION RATE: 14 BRPM | HEART RATE: 53 BPM | OXYGEN SATURATION: 98 % | SYSTOLIC BLOOD PRESSURE: 120 MMHG | BODY MASS INDEX: 37.3 KG/M2

## 2018-05-25 DIAGNOSIS — N76.0 ACUTE VAGINITIS: ICD-10-CM

## 2018-05-25 DIAGNOSIS — E53.8 B12 DEFICIENCY: ICD-10-CM

## 2018-05-25 DIAGNOSIS — F03.90 MAJOR NEUROCOGNITIVE DISORDER (HCC): Primary | ICD-10-CM

## 2018-05-25 DIAGNOSIS — M06.9 RHEUMATOID ARTHRITIS INVOLVING MULTIPLE SITES, UNSPECIFIED RHEUMATOID FACTOR PRESENCE: ICD-10-CM

## 2018-05-25 PROCEDURE — 96372 THER/PROPH/DIAG INJ SC/IM: CPT | Performed by: FAMILY MEDICINE

## 2018-05-25 PROCEDURE — 99214 OFFICE O/P EST MOD 30 MIN: CPT | Performed by: FAMILY MEDICINE

## 2018-05-25 RX ORDER — MEMANTINE HYDROCHLORIDE 14 MG/1
14 CAPSULE, EXTENDED RELEASE ORAL DAILY
Qty: 90 CAPSULE | Refills: 1 | Status: SHIPPED | OUTPATIENT
Start: 2018-05-25 | End: 2018-07-19 | Stop reason: SDUPTHER

## 2018-05-25 RX ORDER — DIVALPROEX SODIUM 125 MG/1
250 CAPSULE, COATED PELLETS ORAL EVERY 8 HOURS SCHEDULED
Qty: 180 CAPSULE | Refills: 2 | Status: SHIPPED | OUTPATIENT
Start: 2018-05-25 | End: 2018-05-25 | Stop reason: SDUPTHER

## 2018-05-25 RX ORDER — QUETIAPINE FUMARATE 300 MG/1
300 TABLET, FILM COATED ORAL NIGHTLY
Qty: 90 TABLET | Refills: 1 | Status: SHIPPED | OUTPATIENT
Start: 2018-05-25 | End: 2018-06-14 | Stop reason: SDUPTHER

## 2018-05-25 RX ORDER — DONEPEZIL HYDROCHLORIDE 10 MG/1
10 TABLET, FILM COATED ORAL NIGHTLY
Qty: 90 TABLET | Refills: 1 | Status: SHIPPED | OUTPATIENT
Start: 2018-05-25 | End: 2018-07-19 | Stop reason: SDUPTHER

## 2018-05-25 RX ORDER — OXYCODONE HYDROCHLORIDE 15 MG/1
15 TABLET ORAL 2 TIMES DAILY
Qty: 60 TABLET | Refills: 0 | Status: SHIPPED | OUTPATIENT
Start: 2018-05-25 | End: 2018-07-19 | Stop reason: SDUPTHER

## 2018-05-25 RX ORDER — QUETIAPINE FUMARATE 300 MG/1
300 TABLET, FILM COATED ORAL NIGHTLY
Qty: 30 TABLET | Refills: 2 | Status: SHIPPED | OUTPATIENT
Start: 2018-05-25 | End: 2018-05-25 | Stop reason: SDUPTHER

## 2018-05-25 RX ORDER — FLUCONAZOLE 150 MG/1
TABLET ORAL
Qty: 2 TABLET | Refills: 0 | Status: SHIPPED | OUTPATIENT
Start: 2018-05-25 | End: 2018-08-31

## 2018-05-25 RX ORDER — CYANOCOBALAMIN 1000 UG/ML
1000 INJECTION, SOLUTION INTRAMUSCULAR; SUBCUTANEOUS
Status: DISCONTINUED | OUTPATIENT
Start: 2018-05-25 | End: 2019-03-01

## 2018-05-25 RX ORDER — DONEPEZIL HYDROCHLORIDE 10 MG/1
10 TABLET, FILM COATED ORAL NIGHTLY
Qty: 30 TABLET | Refills: 2 | Status: SHIPPED | OUTPATIENT
Start: 2018-05-25 | End: 2018-05-25 | Stop reason: SDUPTHER

## 2018-05-25 RX ORDER — DIVALPROEX SODIUM 125 MG/1
250 CAPSULE, COATED PELLETS ORAL EVERY 8 HOURS SCHEDULED
Qty: 540 CAPSULE | Refills: 1 | Status: SHIPPED | OUTPATIENT
Start: 2018-05-25 | End: 2018-07-19 | Stop reason: SDUPTHER

## 2018-05-25 RX ORDER — MEMANTINE HYDROCHLORIDE 14 MG/1
14 CAPSULE, EXTENDED RELEASE ORAL DAILY
Qty: 30 CAPSULE | Refills: 2 | Status: SHIPPED | OUTPATIENT
Start: 2018-05-25 | End: 2018-05-25 | Stop reason: SDUPTHER

## 2018-05-25 RX ADMIN — CYANOCOBALAMIN 1000 MCG: 1000 INJECTION, SOLUTION INTRAMUSCULAR; SUBCUTANEOUS at 15:52

## 2018-05-25 NOTE — PROGRESS NOTES
Assessment/Plan       Problems Addressed this Visit        Digestive    B12 deficiency    Relevant Medications    cyanocobalamin injection 1,000 mcg       Musculoskeletal and Integument    Rheumatoid arthritis    Relevant Medications    oxyCODONE (ROXICODONE) 15 MG immediate release tablet       Other    Major neurocognitive disorder - Primary    Relevant Medications    Divalproex Sodium (DEPAKOTE SPRINKLES) 125 MG capsule    donepezil (ARICEPT) 10 MG tablet    memantine (NAMENDA XR) 14 MG capsule sustained-release 24 hr extended release capsule    QUEtiapine (SEROquel) 300 MG tablet      Other Visit Diagnoses     Acute vaginitis        Relevant Medications    fluconazole (DIFLUCAN) 150 MG tablet            Follow up: Return in about 3 months (around 8/25/2018), or if symptoms worsen or fail to improve.     DISCUSSION  Hospital follow-up.  Major neurocognitive disorder.  Evaluated at [facility].  Psychiatric medications as noted started.  Doing much much better.  Refilled medications as noted.  Follow-up in 3 months.    Rheumatoid arthritis with chronic pain.  We will try and start weaning down on the oxycodone.  Decrease to 15 mg twice a day.  Apparently she had been down to 5 mg 4 times a day in the hospital but then when she got home, her knee started her back on the 20 mg twice a day.  She has been doing this for several weeks.    Vaginitis.  Refilled Diflucan.  Did help and symptoms return.  Call if not better.    B12 deficiency.  B12 shot today.      MEDICATIONS PRESCRIBED  Requested Prescriptions     Signed Prescriptions Disp Refills   • oxyCODONE (ROXICODONE) 15 MG immediate release tablet 60 tablet 0     Sig: Take 1 tablet by mouth 2 (Two) Times a Day.   • fluconazole (DIFLUCAN) 150 MG tablet 2 tablet 0     Sig: one by mouth today and repeat in 3 days if not better   • Divalproex Sodium (DEPAKOTE SPRINKLES) 125 MG capsule 540 capsule 1     Sig: Take 2 capsules by mouth Every 8 (Eight) Hours.   •  donepezil (ARICEPT) 10 MG tablet 90 tablet 1     Sig: Take 1 tablet by mouth Every Night.   • memantine (NAMENDA XR) 14 MG capsule sustained-release 24 hr extended release capsule 90 capsule 1     Sig: Take 1 capsule by mouth Daily.   • QUEtiapine (SEROquel) 300 MG tablet 90 tablet 1     Sig: Take 1 tablet by mouth Every Night.            Obi dated on 3/19/2018  was reviewed and appropriate.        -------------------------------------------    Subjective     Chief Complaint   Patient presents with   • hospital follow up         History of Present Illness    Confusion  Follow up  Was in hospital in [place] for 10 days  Mental status changes, anger    Doing better now    She does not recall the reason why    Eating ok    Sleeping ok  -----------------------------  Chronic pain  No pain now  Significant rheumatoid arthritis.  On methotrexate.  Did have some pain in the right side of neck and head  Hand swelling still but stable on current pain medication.    [place] fixes the pill boxes and the staff administer it.  ( [facility])  Is currently on oxycodone 20 mg twice a day.  Apparently this did decrease while in the hospital but when she came back home, he was restarted back at 20 mg twice a day.  -------------------------------  Still vaginal irritation and burning  Diflucan was given and helped and then came back  Needs new rx    --------------------------------    B12 deficiency  Due for injection.  Has not had one since last time here.            History   Smoking Status   • Former Smoker   • Quit date: 02/2018   Smokeless Tobacco   • Never Used        Past Medical History,Medications, Allergies, and social history was reviewed.      Review of Systems   Constitutional: Negative for fatigue.   HENT: Negative.    Respiratory: Positive for shortness of breath ( Stable).    Cardiovascular: Negative.    Gastrointestinal: Negative.    Musculoskeletal: Positive for arthralgias and neck pain.   Neurological:  "Negative.    Psychiatric/Behavioral: Positive for behavioral problems ( Better with medication). The patient is nervous/anxious.        Objective     Vitals:    05/25/18 1454   BP: 120/58   Pulse: 53   Resp: 14   Temp: 97.5 °F (36.4 °C)   TempSrc: Temporal Artery    SpO2: 98%   Weight: 86.2 kg (190 lb)   Height: 152.4 cm (60\")          Physical Exam   Constitutional: She is oriented to person, place, and time. She appears well-developed and well-nourished.   HENT:   Head: Normocephalic and atraumatic.   Right Ear: Hearing and external ear normal.   Left Ear: Hearing and external ear normal.   Mouth/Throat: Oropharynx is clear and moist.   Eyes: Conjunctivae and EOM are normal. Pupils are equal, round, and reactive to light.   Cardiovascular: Normal rate, regular rhythm and normal heart sounds.  Exam reveals no friction rub.    No murmur heard.  Pulmonary/Chest: Effort normal and breath sounds normal. No respiratory distress. She has no wheezes. She has no rales.   Musculoskeletal:   Changes in hands consistent with her history of rheumatoid arthritis.  No tenderness at present.   Neurological: She is alert and oriented to person, place, and time.   Skin: Skin is warm.   Psychiatric: She has a normal mood and affect. Her speech is normal and behavior is normal. Her mood appears not anxious. She does not exhibit a depressed mood.   Nursing note and vitals reviewed.                Dick Blunt MD    "

## 2018-06-04 ENCOUNTER — TELEPHONE (OUTPATIENT)
Dept: FAMILY MEDICINE CLINIC | Facility: CLINIC | Age: 78
End: 2018-06-04

## 2018-06-04 ENCOUNTER — OFFICE VISIT (OUTPATIENT)
Dept: FAMILY MEDICINE CLINIC | Facility: CLINIC | Age: 78
End: 2018-06-04

## 2018-06-04 VITALS
HEIGHT: 60 IN | BODY MASS INDEX: 36.52 KG/M2 | TEMPERATURE: 98.5 F | RESPIRATION RATE: 14 BRPM | OXYGEN SATURATION: 90 % | WEIGHT: 186 LBS | HEART RATE: 48 BPM

## 2018-06-04 DIAGNOSIS — E78.2 MIXED HYPERLIPIDEMIA: ICD-10-CM

## 2018-06-04 DIAGNOSIS — E11.9 TYPE 2 DIABETES MELLITUS WITHOUT COMPLICATION, WITHOUT LONG-TERM CURRENT USE OF INSULIN (HCC): ICD-10-CM

## 2018-06-04 DIAGNOSIS — R25.1 TREMOR: Primary | ICD-10-CM

## 2018-06-04 LAB
ALBUMIN SERPL-MCNC: 3.8 G/DL (ref 3.2–4.8)
ALBUMIN/GLOB SERPL: 1.6 G/DL (ref 1.5–2.5)
ALP SERPL-CCNC: 64 U/L (ref 25–100)
ALT SERPL-CCNC: 9 U/L (ref 7–40)
AST SERPL-CCNC: 13 U/L (ref 0–33)
BASOPHILS # BLD AUTO: 0.01 10*3/MM3 (ref 0–0.2)
BASOPHILS NFR BLD AUTO: 0.1 % (ref 0–1)
BILIRUB SERPL-MCNC: 0.5 MG/DL (ref 0.3–1.2)
BUN SERPL-MCNC: 40 MG/DL (ref 9–23)
BUN/CREAT SERPL: 44.4 (ref 7–25)
CALCIUM SERPL-MCNC: 8.8 MG/DL (ref 8.7–10.4)
CHLORIDE SERPL-SCNC: 107 MMOL/L (ref 99–109)
CHOLEST SERPL-MCNC: 182 MG/DL (ref 0–200)
CHOLEST/HDLC SERPL: 2.94 {RATIO}
CO2 SERPL-SCNC: 31 MMOL/L (ref 20–31)
CREAT SERPL-MCNC: 0.9 MG/DL (ref 0.6–1.3)
EOSINOPHIL # BLD AUTO: 0 10*3/MM3 (ref 0–0.3)
EOSINOPHIL NFR BLD AUTO: 0 % (ref 0–3)
ERYTHROCYTE [DISTWIDTH] IN BLOOD BY AUTOMATED COUNT: 14.4 % (ref 11.3–14.5)
GFR SERPLBLD CREATININE-BSD FMLA CKD-EPI: 61 ML/MIN/1.73
GFR SERPLBLD CREATININE-BSD FMLA CKD-EPI: 74 ML/MIN/1.73
GLOBULIN SER CALC-MCNC: 2.4 GM/DL
GLUCOSE SERPL-MCNC: 66 MG/DL (ref 70–100)
HBA1C MFR BLD: 5.4 % (ref 4.8–5.6)
HCT VFR BLD AUTO: 37.6 % (ref 34.5–44)
HDLC SERPL-MCNC: 62 MG/DL (ref 40–60)
HGB BLD-MCNC: 11.8 G/DL (ref 11.5–15.5)
IMM GRANULOCYTES # BLD: 0.05 10*3/MM3 (ref 0–0.03)
IMM GRANULOCYTES NFR BLD: 0.5 % (ref 0–0.6)
LDLC SERPL CALC-MCNC: 107 MG/DL (ref 0–100)
LYMPHOCYTES # BLD AUTO: 0.97 10*3/MM3 (ref 0.6–4.8)
LYMPHOCYTES NFR BLD AUTO: 9.4 % (ref 24–44)
MCH RBC QN AUTO: 30.9 PG (ref 27–31)
MCHC RBC AUTO-ENTMCNC: 31.4 G/DL (ref 32–36)
MCV RBC AUTO: 98.4 FL (ref 80–99)
MONOCYTES # BLD AUTO: 0.39 10*3/MM3 (ref 0–1)
MONOCYTES NFR BLD AUTO: 3.8 % (ref 0–12)
NEUTROPHILS # BLD AUTO: 8.9 10*3/MM3 (ref 1.5–8.3)
NEUTROPHILS NFR BLD AUTO: 86.2 % (ref 41–71)
PLATELET # BLD AUTO: 147 10*3/MM3 (ref 150–450)
POTASSIUM SERPL-SCNC: 5.7 MMOL/L (ref 3.5–5.5)
PROT SERPL-MCNC: 6.2 G/DL (ref 5.7–8.2)
RBC # BLD AUTO: 3.82 10*6/MM3 (ref 3.89–5.14)
SODIUM SERPL-SCNC: 142 MMOL/L (ref 132–146)
TRIGL SERPL-MCNC: 64 MG/DL (ref 0–150)
TSH SERPL DL<=0.005 MIU/L-ACNC: 0.3 MIU/ML (ref 0.35–5.35)
VALPROATE SERPL-MCNC: 60 MCG/ML (ref 50–150)
VIT B12 SERPL-MCNC: 791 PG/ML (ref 211–911)
VLDLC SERPL CALC-MCNC: 12.8 MG/DL
WBC # BLD AUTO: 10.32 10*3/MM3 (ref 3.5–10.8)

## 2018-06-04 PROCEDURE — 99214 OFFICE O/P EST MOD 30 MIN: CPT | Performed by: FAMILY MEDICINE

## 2018-06-04 NOTE — TELEPHONE ENCOUNTER
ANAHY MILLER CALLING BC SHE HAS CONCERNS AND PT IS STARTED SINCE LAST WEEK MUSCLE TWITCHING AND MUSCLE SPASMS AND SHE ISN'T ABLE TO HOLD ANYTHING AS SHE DROPS IT. PAUL STATES THAT PT ISN'T SURE WHAT IS GOING ON AND SHE NEEDS TO HAVE HER MEDS CK AND PAUL STATES THAT SHE ISN'T SAFE AND PT NOW STAYS IN HER ROOM. [phone] PAUL MILLER THINKS PT SHOULD BE SEEN ASAP AS SOMETHING IS GOING ON AND SHE IS CALLING WILLARD TO SCHEDULE APPT. PLEASE ADVISE

## 2018-06-04 NOTE — PROGRESS NOTES
Assessment/Plan       Problems Addressed this Visit        Cardiovascular and Mediastinum    Hyperlipidemia    Relevant Orders    Comprehensive Metabolic Panel    Lipid Panel With / Chol / HDL Ratio       Endocrine    Type 2 diabetes mellitus without complication, without long-term current use of insulin    Relevant Orders    Comprehensive Metabolic Panel    Lipid Panel With / Chol / HDL Ratio    Hemoglobin A1c      Other Visit Diagnoses     Tremor    -  Primary    Relevant Orders    CBC & Differential    Comprehensive Metabolic Panel    TSH    Vitamin B12    Valproic Acid Level, Total            Follow up: Return if symptoms worsen or fail to improve.     DISCUSSION  Tremor.  Unclear cause.  May be side effect to the psychiatric medication that she is on.  She is also on high dose of Depakote.  We will check level of that and if that is normal, then we will start weaning other medications but the initial medication being Seroquel to cut back on.  Patient and niece agree.    Hyperlipidemia.  Due for recheck lipid panel.  Recheck that today.    Diabetes mellitus type 2.  Controlled.  We will check labs as noted including A1c.      MEDICATIONS PRESCRIBED  Requested Prescriptions      No prescriptions requested or ordered in this encounter            -------------------------------------------    Subjective     Chief Complaint   Patient presents with   • muscle spasam     this all started really bad, pt went to ER for lumbar issues and lynn noticed it on saturday and one of the staff at nursing home states that she noticed it on thursday.    • Shaking         History of Present Illness    Spasms and shaking  Was having back pain and to ER this weekend. Gave her steroids and percocet. Back still hurts    Shaking began: LAst Thursday  dropping things  Spilling things  Upper body shakes as well    Hyperlipidemia  On simvastatin.  Due for recheck levels.  Stop smoking.  No reports of side effects from  "medication    Diabetes mellitus type 2.  Currently just on glipizide.  Has not been eating quite as much given issues that are going on right now.  She is now residing in [facility].  Does not actively smoke anymore.  No reported chest pain or shortness of breath.        History   Smoking Status   • Former Smoker   • Quit date: 02/2018   Smokeless Tobacco   • Never Used        Past Medical History,Medications, Allergies, and social history was reviewed.      Review of Systems   Constitutional: Positive for fatigue.   HENT: Negative.    Respiratory: Negative.    Cardiovascular: Negative.    Gastrointestinal: Negative.    Musculoskeletal: Positive for arthralgias and myalgias.   Psychiatric/Behavioral: Positive for sleep disturbance and depressed mood. The patient is nervous/anxious.        Objective     Vitals:    06/04/18 1243   Pulse: (!) 48   Resp: 14   Temp: 98.5 °F (36.9 °C)   TempSrc: Temporal Artery    SpO2: 90%   Weight: 84.4 kg (186 lb)   Height: 152.4 cm (60\")          Physical Exam   Constitutional: She is oriented to person, place, and time. She appears well-developed and well-nourished.   Nervous and anxious   HENT:   Head: Normocephalic and atraumatic.   Right Ear: Hearing and external ear normal.   Left Ear: Hearing and external ear normal.   Mouth/Throat: Oropharynx is clear and moist.   Eyes: Conjunctivae and EOM are normal. Pupils are equal, round, and reactive to light.   Cardiovascular: Normal rate, regular rhythm and normal heart sounds.  Exam reveals no friction rub.    No murmur heard.  Pulmonary/Chest: Effort normal and breath sounds normal. No respiratory distress. She has no wheezes. She has no rales.   Musculoskeletal: She exhibits no edema.   Neurological: She is alert and oriented to person, place, and time.   Skin: Skin is warm.   Psychiatric: Her speech is normal and behavior is normal. Her mood appears anxious.   Nursing note and vitals reviewed.                Dick Blunt, " MD

## 2018-06-05 ENCOUNTER — TELEPHONE (OUTPATIENT)
Dept: FAMILY MEDICINE CLINIC | Facility: CLINIC | Age: 78
End: 2018-06-05

## 2018-06-05 NOTE — TELEPHONE ENCOUNTER
Please see result note.  I tried to call the numbers that were listed for Noris but they are both disconnected.  Please call [facility] if not able to reach her with results of labs.

## 2018-06-06 ENCOUNTER — TELEPHONE (OUTPATIENT)
Dept: FAMILY MEDICINE CLINIC | Facility: CLINIC | Age: 78
End: 2018-06-06

## 2018-06-06 LAB
Lab: NORMAL
Lab: NORMAL

## 2018-06-06 NOTE — TELEPHONE ENCOUNTER
Spoke with niece and informed her of results and noris verbalized understanding. Noris states she didn't see pt yesterday and she is going  [facility] and see how pt is doing and see if she feels like she needs to take pt to ER. Noris will call and let us know how pt is doing and if she takes her to ER. Add on test faxed over.

## 2018-06-06 NOTE — TELEPHONE ENCOUNTER
----- Message from Jing Carlos sent at 6/6/2018  8:41 AM EDT -----  Contact: DR CARRASCO  PATIENTS CAREGIVER IS CALLING TO LET YOU KNOW THAT YESTERDAY SHE WAS MORE CONFUSED AND DIDN'T SLEEP. THE PATIENT STILL HAS SHAKES AS WELL.  [phone] WILLARD DAWN

## 2018-06-06 NOTE — TELEPHONE ENCOUNTER
I SPOKE WITH PT CHARLES AND INFORMED HER OF THIS AND OF ER LE[facility] IF NOT BETTER. CHARLES WILL CALL US AND LET US KNOW WHAT THEY PLAN TO DO.

## 2018-06-06 NOTE — TELEPHONE ENCOUNTER
Please see lab result. Need to go to ER if more confused. Labs showed dehydration and may need fluids.

## 2018-06-07 ENCOUNTER — TELEPHONE (OUTPATIENT)
Dept: FAMILY MEDICINE CLINIC | Facility: CLINIC | Age: 78
End: 2018-06-07

## 2018-06-07 LAB
T3 SERPL-MCNC: 60 NG/DL (ref 71–180)
T4 SERPL-MCNC: 4.8 MCG/DL (ref 4.7–11.4)
WRITTEN AUTHORIZATION: NORMAL

## 2018-06-07 NOTE — TELEPHONE ENCOUNTER
Separate phone message regarding this subject from today:    Patient went to the ER at [facility] yesterday and did blood work and was told she was not dehydrated but she had a urinary tract infection. Noris wanted to let Dr. Blunt know. If any questions, please call [phone].

## 2018-06-07 NOTE — TELEPHONE ENCOUNTER
LVM arpita ken cb. Provided office number. Advised on message that we did want to see Pt back in one week for a recheck or sooner if worsening, she only needed to speak with me if she had further questions.

## 2018-06-07 NOTE — TELEPHONE ENCOUNTER
----- Message from Arianne Syed sent at 6/7/2018  8:11 AM EDT -----  Contact: Jose Raul  Patient went to the ER at [facility] yesterday and did blood work and was told she was not dehydrated but she had a urinary tract infection. Noris wanted to let Dr. Blunt know. If any questions, please call [phone].

## 2018-06-08 ENCOUNTER — TELEPHONE (OUTPATIENT)
Dept: FAMILY MEDICINE CLINIC | Facility: CLINIC | Age: 78
End: 2018-06-08

## 2018-06-08 DIAGNOSIS — I10 BENIGN ESSENTIAL HYPERTENSION: ICD-10-CM

## 2018-06-08 DIAGNOSIS — F03.90 MAJOR NEUROCOGNITIVE DISORDER (HCC): ICD-10-CM

## 2018-06-08 NOTE — TELEPHONE ENCOUNTER
SPOKE WITH [facility] AND THEY WILL ONLY BE USING [facility] AND PT WILL NOT NEED ANY RX FOR SOME TIME BUT WHEN SHE DOES THEY WANT IT TO BE AVAIL. I PRINTED MED LIST AND FAX TO [facility] AND [facility] ASKING THEM TO REQUEST MEDS FROM [facility] FOR PT.

## 2018-06-08 NOTE — TELEPHONE ENCOUNTER
----- Message from Fuad Rutherford sent at 6/8/2018  2:34 PM EDT -----  Contact: [facility]  PT IS CHANGING PH[facility]Y TO [facility].  THEY NEED ALL RX'S SENT OVER     PLEASE UPDATE CHART TO [facility] [phone]   - [phone]

## 2018-06-11 ENCOUNTER — TELEPHONE (OUTPATIENT)
Dept: FAMILY MEDICINE CLINIC | Facility: CLINIC | Age: 78
End: 2018-06-11

## 2018-06-14 ENCOUNTER — OFFICE VISIT (OUTPATIENT)
Dept: FAMILY MEDICINE CLINIC | Facility: CLINIC | Age: 78
End: 2018-06-14

## 2018-06-14 VITALS
RESPIRATION RATE: 16 BRPM | OXYGEN SATURATION: 98 % | HEART RATE: 48 BPM | DIASTOLIC BLOOD PRESSURE: 50 MMHG | TEMPERATURE: 99.1 F | SYSTOLIC BLOOD PRESSURE: 106 MMHG

## 2018-06-14 DIAGNOSIS — N30.00 ACUTE CYSTITIS WITHOUT HEMATURIA: Primary | ICD-10-CM

## 2018-06-14 DIAGNOSIS — F03.90 MAJOR NEUROCOGNITIVE DISORDER (HCC): ICD-10-CM

## 2018-06-14 LAB
BILIRUB BLD-MCNC: NEGATIVE MG/DL
CLARITY, POC: CLEAR
COLOR UR: YELLOW
GLUCOSE UR STRIP-MCNC: NEGATIVE MG/DL
KETONES UR QL: NEGATIVE
LEUKOCYTE EST, POC: ABNORMAL
NITRITE UR-MCNC: NEGATIVE MG/ML
PH UR: 6 [PH] (ref 5–8)
PROT UR STRIP-MCNC: NEGATIVE MG/DL
RBC # UR STRIP: NEGATIVE /UL
SP GR UR: 1.02 (ref 1–1.03)
UROBILINOGEN UR QL: NORMAL

## 2018-06-14 PROCEDURE — 99213 OFFICE O/P EST LOW 20 MIN: CPT | Performed by: FAMILY MEDICINE

## 2018-06-14 PROCEDURE — 81003 URINALYSIS AUTO W/O SCOPE: CPT | Performed by: FAMILY MEDICINE

## 2018-06-14 RX ORDER — QUETIAPINE FUMARATE 300 MG/1
150 TABLET, FILM COATED ORAL NIGHTLY
Qty: 1 TABLET | Refills: 1 | COMMUNITY
Start: 2018-06-14 | End: 2018-06-18

## 2018-06-14 RX ORDER — LEVOFLOXACIN 500 MG/1
500 TABLET, FILM COATED ORAL DAILY
Qty: 10 TABLET | Refills: 0 | Status: SHIPPED | OUTPATIENT
Start: 2018-06-14 | End: 2018-08-31

## 2018-06-14 NOTE — PROGRESS NOTES
Assessment/Plan       Problems Addressed this Visit        Other    Major neurocognitive disorder    Relevant Medications    QUEtiapine (SEROquel) 300 MG tablet      Other Visit Diagnoses     Acute cystitis without hematuria    -  Primary    Relevant Medications    levoFLOXacin (LEVAQUIN) 500 MG tablet    Other Relevant Orders    Urine Culture - Urine, Urine, Clean Catch    POC Urinalysis Dipstick, Automated (Completed)            Follow up: Return for follow up depends on review of labs and testing.     DISCUSSION  Urinary tract infection.  Had been doing better last week.  Then symptoms including confusion and shakiness started up again.  Urinalysis shows leukocyte esterase.  Check urine culture.  Changed to Levaquin.  Further plan once we have culture back.    I am concerned that some of the symptoms she is having could be related to the high-dose of Seroquel that she is on.  Recommend decrease to 150 mg daily.  She will take one half of the 300 mg tablet.  Her niece will keep us informed on how she is doing.  Call sooner if worsening.      MEDICATIONS PRESCRIBED  Requested Prescriptions     Signed Prescriptions Disp Refills   • levoFLOXacin (LEVAQUIN) 500 MG tablet 10 tablet 0     Sig: Take 1 tablet by mouth Daily.          -------------------------------------------    Subjective     Chief Complaint   Patient presents with   • Urinary Tract Infection     follow up, pt still shaking and trembling really bad, leg were buckling, pt is kind of foggy         History of Present Illness    Still weak and shaking   Last week, when niece said she was doing better  For last 2 days, Increasing symptoms again.  No fevers.  No chills.  No vomiting.    BUN Cr ration was 44. 4 but had increased fluids and in ER was 23  + UTI   Rx Macrobid    Has been confused and foggy    Decreased alert   Last week was better per niece    + eating this week  + fluids  Sleeping ok  Not hard to wake up in am    Does not feel sleepy in  am    Harder to walk today buckled some      History   Smoking Status   • Former Smoker   • Quit date: 02/2018   Smokeless Tobacco   • Never Used        Past Medical History,Medications, Allergies, and social history was reviewed.      Review of Systems   Constitutional: Positive for fatigue.   HENT: Negative.    Respiratory: Negative.    Cardiovascular: Negative.    Gastrointestinal: Negative.    Neurological: Positive for confusion.   Psychiatric/Behavioral: Positive for decreased concentration.       Objective     Vitals:    06/14/18 1530   BP: 106/50   Pulse: (!) 48   Resp: 16   Temp: 99.1 °F (37.3 °C)   TempSrc: Temporal Artery    SpO2: 98%          Physical Exam   Constitutional: She appears well-developed and well-nourished.   HENT:   Head: Normocephalic and atraumatic.   Right Ear: Hearing and external ear normal.   Left Ear: Hearing and external ear normal.   Mouth/Throat: Oropharynx is clear and moist.   Eyes: Conjunctivae and EOM are normal. Pupils are equal, round, and reactive to light.   Cardiovascular: Normal rate, regular rhythm and normal heart sounds.  Exam reveals no friction rub.    No murmur heard.  Pulmonary/Chest: Effort normal and breath sounds normal. No respiratory distress. She has no wheezes. She has no rales.   Neurological: She is alert.   Occasional upper body jerks   Skin: Skin is warm.   Psychiatric: She has a normal mood and affect.   Sleepy and sedated   Nursing note and vitals reviewed.                Dick Blunt MD

## 2018-06-15 ENCOUNTER — TELEPHONE (OUTPATIENT)
Dept: FAMILY MEDICINE CLINIC | Facility: CLINIC | Age: 78
End: 2018-06-15

## 2018-06-15 NOTE — TELEPHONE ENCOUNTER
----- Message from Heather Mcnamara sent at 6/15/2018 11:22 AM EDT -----  Contact: DANILO; PT UPDATE   PT IS A SYMPTOMATIC AND  HER HEART RATE IS CURRENTLY AT 48. BUT THE HOME HEALTH DOES NOT SEE ANY CONCERN.  PT IS RESTING. PT MEDICATION IS BEING SWITCHED TO [facility] TO THE [facility] PACKS.        [facility] NURSE     CALL BACK   [phone]

## 2018-06-15 NOTE — TELEPHONE ENCOUNTER
Please call, thanks. She was here yesterday and HR was the same.   I thought she was going to [facility] now?

## 2018-06-17 LAB
BACTERIA UR CULT: NORMAL
BACTERIA UR CULT: NORMAL

## 2018-06-18 ENCOUNTER — TELEPHONE (OUTPATIENT)
Dept: FAMILY MEDICINE CLINIC | Facility: CLINIC | Age: 78
End: 2018-06-18

## 2018-06-18 DIAGNOSIS — I10 BENIGN ESSENTIAL HYPERTENSION: ICD-10-CM

## 2018-06-18 RX ORDER — METOPROLOL TARTRATE 50 MG/1
50 TABLET, FILM COATED ORAL EVERY 12 HOURS SCHEDULED
COMMUNITY
Start: 2018-06-18 | End: 2018-07-19 | Stop reason: SDUPTHER

## 2018-06-18 RX ORDER — QUETIAPINE FUMARATE 100 MG/1
100 TABLET, FILM COATED ORAL NIGHTLY
Qty: 30 TABLET | Refills: 2 | Status: SHIPPED | OUTPATIENT
Start: 2018-06-18 | End: 2018-07-19 | Stop reason: SDUPTHER

## 2018-06-18 NOTE — TELEPHONE ENCOUNTER
Please call, change Seroquel to 100 mg daily. I am sending in new 30 day Rx to try to [facility]

## 2018-06-18 NOTE — TELEPHONE ENCOUNTER
Please call, how low has her heart been? Has she tolerated the 1/2 dose of Seroquel. We may need to decrease that some more as well.

## 2018-06-18 NOTE — TELEPHONE ENCOUNTER
Notes recorded by Geoff Gregory LPN on 6/18/2018 at 10:13 AM EDT  Spoke with pt's niece, Noris, who states the facility pt resides in had to call 911 on Friday b/c she was so shaky, weak, and couldn't stand. She went to [facility] ER. Her HR was 41. Pt was taking Metoprolol 1 BID. The ER decreased her dose to 1/2 BID. When Noris checked on her yesterday, she was still shaky etc and her HR is still low.  Please advise.

## 2018-07-05 ENCOUNTER — TELEPHONE (OUTPATIENT)
Dept: FAMILY MEDICINE CLINIC | Facility: CLINIC | Age: 78
End: 2018-07-05

## 2018-07-05 DIAGNOSIS — F41.9 ANXIETY: ICD-10-CM

## 2018-07-05 RX ORDER — HYDROXYZINE PAMOATE 25 MG/1
25 CAPSULE ORAL 3 TIMES DAILY
Qty: 270 CAPSULE | Refills: 0 | Status: SHIPPED | OUTPATIENT
Start: 2018-07-05 | End: 2018-07-19 | Stop reason: SDUPTHER

## 2018-07-05 NOTE — TELEPHONE ENCOUNTER
Please call.  She should already be on hydroxyzine/Vistaril and she is already on Cymbalta for depression.  In addition, when she was in the hospital they placed her on Seroquel at a very high dose but I decreased that because of the shaking thinking that was causing some of her symptoms and side effects.  She would need follow-up visit to go over this and see if anything else can be changed or we may need to have her see a psychiatrist.

## 2018-07-05 NOTE — TELEPHONE ENCOUNTER
SPOKE WITH CARE GIVER WILLARD AND INFORMED HER OF THIS AND PT IS STILL TAKING THIS MEDICATION SO A NEW RX WAS SENT IN FOR 3 TIMES DAILY AND WILLARD WILL CALL Monday AND LET US KNOW HOW PT IS DOING WITH THIS.   SPOKE WITH PAUL AND INFORMED HER OF THIS ALSO AND PAUL WILL GO SEE PT ON Monday ALSO AND SEE HOW SHE IS DOING AND LET US KNOW. THE TREMORS ARE GONE BUT THE SHAKING DUE TO BE ANXIOUS IS STILL THERE AND SUDDEN OUT BURST OF CRYING. ALSO PAUL STATES THAT THEY SHOULD HAVE PSYCH. ON STAFF SOON AND SHE WILL BE PLACING A REFERRAL FOR THAT ONCE SHE KNOW WHEN THEY WILL BE STARTING AS SHE BELIEVES THAT PT NEEDS THAT DESPERATELY.

## 2018-07-05 NOTE — TELEPHONE ENCOUNTER
----- Message from Jing Carlos sent at 7/5/2018 11:28 AM EDT -----  Contact: DR CARRASCO  PATIENT IS STILL VERY SHAKY AND VERY ANXIOUS. PATIENTS CARE GIVER WANTS TO KNOW WHAT SHE SHOULD DO?  [phone] WILLARD DAWN

## 2018-07-05 NOTE — TELEPHONE ENCOUNTER
Is she taking the hydroxyzine/Vistaril twice a day.  If not, need to restart that in if she is, try 3 times a day to see if that would help.

## 2018-07-05 NOTE — TELEPHONE ENCOUNTER
PAUL WITH [facility] IS CALLING AS PT COMPLAINS AND SO DOES STAFF AT [facility] THAT PT IS  HAVING A LOT OF ANXIETY AND NOT ABLE  SLEEP WELL AND SHE IS HAVING A LOT OF DEPRESSION GOING ON AND FEELS VERY CONFINED TO HER ROOM. AS SHE IS HAVING THE SHAKING ISSUES AND DOESN'T FEEL COMFORTABLE GOING OUT INTO THE COMMUNITY ROOM OF FACILITY. PAUL IS ASKING IF YOU CAN PLACE PT ON SOME SORT OF ANXIETY AND DEPRESSION MED LIKE DIAZEPAM OR VISTARIL OR SOMETHING LIKE THAT.     [phone] PAUL.

## 2018-07-09 ENCOUNTER — TELEPHONE (OUTPATIENT)
Dept: FAMILY MEDICINE CLINIC | Facility: CLINIC | Age: 78
End: 2018-07-09

## 2018-07-09 NOTE — TELEPHONE ENCOUNTER
----- Message from Heather Mcnamara sent at 7/9/2018  2:22 PM EDT -----  Contact: DANILO; PT UPDATE  PT CHARLES CALLED IN STATED THAT SHE IS DONG WELL SINCE THE NEW PILL WAS ADDED TO THE    CALL BACK ON FIE  [phone]

## 2018-07-12 ENCOUNTER — TELEPHONE (OUTPATIENT)
Dept: FAMILY MEDICINE CLINIC | Facility: CLINIC | Age: 78
End: 2018-07-12

## 2018-07-12 DIAGNOSIS — F34.1 DYSTHYMIA: Primary | ICD-10-CM

## 2018-07-16 ENCOUNTER — TELEPHONE (OUTPATIENT)
Dept: FAMILY MEDICINE CLINIC | Facility: CLINIC | Age: 78
End: 2018-07-16

## 2018-07-16 NOTE — TELEPHONE ENCOUNTER
----- Message from Rosemary Mendoza sent at 7/16/2018 11:22 AM EDT -----  Contact: RADHA FROM [facility]  ORDER FOR PSY EVAL WAS COMPLETED AND NEEDING ORDER TO CONTINUE  TO MAKE PSY VISITS    [phone]

## 2018-07-18 ENCOUNTER — TELEPHONE (OUTPATIENT)
Dept: FAMILY MEDICINE CLINIC | Facility: CLINIC | Age: 78
End: 2018-07-18

## 2018-07-18 DIAGNOSIS — F03.90 MAJOR NEUROCOGNITIVE DISORDER (HCC): ICD-10-CM

## 2018-07-18 DIAGNOSIS — I10 BENIGN ESSENTIAL HYPERTENSION: ICD-10-CM

## 2018-07-18 DIAGNOSIS — M06.9 RHEUMATOID ARTHRITIS INVOLVING MULTIPLE SITES, UNSPECIFIED RHEUMATOID FACTOR PRESENCE: ICD-10-CM

## 2018-07-18 DIAGNOSIS — N39.46 MIXED INCONTINENCE: ICD-10-CM

## 2018-07-18 DIAGNOSIS — F41.9 ANXIETY: ICD-10-CM

## 2018-07-18 DIAGNOSIS — E78.2 MIXED HYPERLIPIDEMIA: ICD-10-CM

## 2018-07-18 NOTE — TELEPHONE ENCOUNTER
----- Message from Rosemary Christensen sent at 7/18/2018  2:01 PM EDT -----  Contact: [facility] REQUESTING REFILLS:    NAMENDA XR 14 MG  SIMVASTATIN 20 MG

## 2018-07-18 NOTE — TELEPHONE ENCOUNTER
----- Message from Rosemary Christensen sent at 7/18/2018  2:23 PM EDT -----  Contact: [facility] CALLED BECAUSE PATIENT HAS CHANGED PHARMACYS TO THEM.    THEY WOULD JUST LIKE TO REQUEST ALL OF HER MEDICATIONS FOR A REFILL.  HE IS CURRENTLY FIXING HER MEDICATION PACKETS AND IT WOULD BE EASIER IF THEY WERE ALL NEW RX.     [facility]

## 2018-07-19 DIAGNOSIS — M06.9 RHEUMATOID ARTHRITIS INVOLVING MULTIPLE SITES, UNSPECIFIED RHEUMATOID FACTOR PRESENCE: ICD-10-CM

## 2018-07-19 RX ORDER — DIVALPROEX SODIUM 125 MG/1
250 CAPSULE, COATED PELLETS ORAL EVERY 8 HOURS SCHEDULED
Qty: 180 CAPSULE | Refills: 5 | Status: SHIPPED | OUTPATIENT
Start: 2018-07-19 | End: 2019-02-25 | Stop reason: SDUPTHER

## 2018-07-19 RX ORDER — QUETIAPINE FUMARATE 100 MG/1
100 TABLET, FILM COATED ORAL NIGHTLY
Qty: 30 TABLET | Refills: 2 | Status: SHIPPED | OUTPATIENT
Start: 2018-07-19 | End: 2018-12-03 | Stop reason: SDUPTHER

## 2018-07-19 RX ORDER — LISINOPRIL 20 MG/1
20 TABLET ORAL DAILY
Qty: 30 TABLET | Refills: 5 | Status: SHIPPED | OUTPATIENT
Start: 2018-07-19 | End: 2019-04-22 | Stop reason: SDUPTHER

## 2018-07-19 RX ORDER — METOPROLOL TARTRATE 50 MG/1
50 TABLET, FILM COATED ORAL EVERY 12 HOURS SCHEDULED
Qty: 60 TABLET | Refills: 5 | Status: SHIPPED | OUTPATIENT
Start: 2018-07-19

## 2018-07-19 RX ORDER — SIMVASTATIN 20 MG
20 TABLET ORAL NIGHTLY
Qty: 30 TABLET | Refills: 5 | Status: SHIPPED | OUTPATIENT
Start: 2018-07-19 | End: 2018-12-05 | Stop reason: SDUPTHER

## 2018-07-19 RX ORDER — OXYBUTYNIN CHLORIDE 5 MG/1
5 TABLET ORAL 2 TIMES DAILY
Qty: 60 TABLET | Refills: 5 | Status: SHIPPED | OUTPATIENT
Start: 2018-07-19 | End: 2018-12-31 | Stop reason: SDUPTHER

## 2018-07-19 RX ORDER — AMLODIPINE BESYLATE 10 MG/1
10 TABLET ORAL DAILY
Qty: 30 TABLET | Refills: 5 | Status: SHIPPED | OUTPATIENT
Start: 2018-07-19 | End: 2018-12-05 | Stop reason: SDUPTHER

## 2018-07-19 RX ORDER — DULOXETIN HYDROCHLORIDE 60 MG/1
60 CAPSULE, DELAYED RELEASE ORAL DAILY
Qty: 30 CAPSULE | Refills: 5 | Status: SHIPPED | OUTPATIENT
Start: 2018-07-19 | End: 2018-12-31 | Stop reason: SDUPTHER

## 2018-07-19 RX ORDER — POTASSIUM CHLORIDE 750 MG/1
20 TABLET, EXTENDED RELEASE ORAL 2 TIMES DAILY
Qty: 120 TABLET | Refills: 5 | Status: SHIPPED | OUTPATIENT
Start: 2018-07-19 | End: 2018-12-31 | Stop reason: SDUPTHER

## 2018-07-19 RX ORDER — GABAPENTIN 800 MG/1
800 TABLET ORAL 3 TIMES DAILY
Qty: 90 TABLET | Refills: 2 | Status: SHIPPED | OUTPATIENT
Start: 2018-07-19 | End: 2018-12-31 | Stop reason: SDUPTHER

## 2018-07-19 RX ORDER — OXYCODONE HYDROCHLORIDE 15 MG/1
15 TABLET ORAL 2 TIMES DAILY
Qty: 60 TABLET | Refills: 0 | Status: SHIPPED | OUTPATIENT
Start: 2018-07-19 | End: 2019-03-01

## 2018-07-19 RX ORDER — OXYCODONE HYDROCHLORIDE 15 MG/1
TABLET ORAL
Qty: 60 TABLET | Refills: 0 | Status: CANCELLED | OUTPATIENT
Start: 2018-07-19

## 2018-07-19 RX ORDER — DONEPEZIL HYDROCHLORIDE 10 MG/1
10 TABLET, FILM COATED ORAL NIGHTLY
Qty: 30 TABLET | Refills: 5 | Status: SHIPPED | OUTPATIENT
Start: 2018-07-19 | End: 2019-01-28 | Stop reason: SDUPTHER

## 2018-07-19 RX ORDER — HYDROXYZINE PAMOATE 50 MG/1
50 CAPSULE ORAL 3 TIMES DAILY
Qty: 90 CAPSULE | Refills: 5 | Status: SHIPPED | OUTPATIENT
Start: 2018-07-19

## 2018-07-19 RX ORDER — TRAZODONE HYDROCHLORIDE 50 MG/1
TABLET ORAL
Qty: 15 TABLET | Refills: 5 | Status: SHIPPED | OUTPATIENT
Start: 2018-07-19 | End: 2018-12-31 | Stop reason: SDUPTHER

## 2018-07-19 RX ORDER — MEMANTINE HYDROCHLORIDE 14 MG/1
14 CAPSULE, EXTENDED RELEASE ORAL DAILY
Qty: 30 CAPSULE | Refills: 5 | Status: SHIPPED | OUTPATIENT
Start: 2018-07-19 | End: 2019-01-28 | Stop reason: SDUPTHER

## 2018-07-19 NOTE — TELEPHONE ENCOUNTER
SPOKE WITH WILLARD AND INFORMED HER OF THIS AND WILLARD VERBALIZED UNDERSTANDING. I WILL CALL NURSE TOMORROW [phone]

## 2018-07-19 NOTE — TELEPHONE ENCOUNTER
I sent all meds in has requested by [facility]    Let oh know, will have to see if the psychiatric nurse that is seeing Ms Coburn has any suggestions for her anxiety.

## 2018-07-19 NOTE — TELEPHONE ENCOUNTER
Spoke with oh sifuentes of pt and she thought that pt was taken this med and she will go out and ck on this. Pt states [facility] isn't given her all of her med. They are coming in blister packs now and oh is concerned now and going to ck on her meds. Pt is still confused and nervous and anxious.

## 2018-07-19 NOTE — TELEPHONE ENCOUNTER
Spoke with noris and the rx written in may wasn't needed and was filled in June and now is needing a refill for July and if you can send that in for pt much appreciated. Noris stopped [facility] and spoke with pharm.

## 2018-07-19 NOTE — TELEPHONE ENCOUNTER
Please call [facility] , do they need 90 day supplies? Or 30 days at a time?     Also, call bear Hamm , and see if she is still taking the oxycodone 15. I had not given that since May?

## 2018-07-19 NOTE — TELEPHONE ENCOUNTER
SPOKE WITH [facility] AND HE STATES THEY DO 30 DAYS AT TIME.   LVM FOR PT NIECE TO RETURN CALL ABOUT PAIN MED AS WE HAVEN'T FILLED IN AWHILE AND IF PT ISN'T TAKEN REGULAR WE WILL NOT REFILL

## 2018-07-31 ENCOUNTER — TELEPHONE (OUTPATIENT)
Dept: FAMILY MEDICINE CLINIC | Facility: CLINIC | Age: 78
End: 2018-07-31

## 2018-07-31 NOTE — TELEPHONE ENCOUNTER
----- Message from Rosemary Christensen sent at 7/31/2018 12:53 PM EDT -----  Contact: DANILO REYNAGA WITH [facility] CALLING TO LET YOU KNOW THERE WAS A MISSED VISIT LAST WEEK, SHE WAS OUT WITH HER NIECE. BUT SHE HAS ALREADY SEEN HER THIS WEEK.    [phone]

## 2018-08-31 ENCOUNTER — OFFICE VISIT (OUTPATIENT)
Dept: FAMILY MEDICINE CLINIC | Facility: CLINIC | Age: 78
End: 2018-08-31

## 2018-08-31 VITALS
TEMPERATURE: 97.3 F | WEIGHT: 176 LBS | BODY MASS INDEX: 34.55 KG/M2 | HEART RATE: 52 BPM | HEIGHT: 60 IN | DIASTOLIC BLOOD PRESSURE: 60 MMHG | SYSTOLIC BLOOD PRESSURE: 118 MMHG | RESPIRATION RATE: 20 BRPM

## 2018-08-31 DIAGNOSIS — I10 BENIGN ESSENTIAL HYPERTENSION: ICD-10-CM

## 2018-08-31 DIAGNOSIS — F41.9 ANXIETY: ICD-10-CM

## 2018-08-31 DIAGNOSIS — E53.8 B12 DEFICIENCY: ICD-10-CM

## 2018-08-31 DIAGNOSIS — J06.9 PROTRACTED URI: Primary | ICD-10-CM

## 2018-08-31 DIAGNOSIS — M05.79 RHEUMATOID ARTHRITIS INVOLVING MULTIPLE SITES WITH POSITIVE RHEUMATOID FACTOR (HCC): ICD-10-CM

## 2018-08-31 PROCEDURE — 96372 THER/PROPH/DIAG INJ SC/IM: CPT | Performed by: FAMILY MEDICINE

## 2018-08-31 PROCEDURE — 99213 OFFICE O/P EST LOW 20 MIN: CPT | Performed by: FAMILY MEDICINE

## 2018-08-31 RX ORDER — AMOXICILLIN 500 MG/1
1000 CAPSULE ORAL
Qty: 28 CAPSULE | Refills: 0 | Status: SHIPPED | OUTPATIENT
Start: 2018-08-31

## 2018-08-31 RX ORDER — CYANOCOBALAMIN 1000 UG/ML
1000 INJECTION, SOLUTION INTRAMUSCULAR; SUBCUTANEOUS ONCE
Status: COMPLETED | OUTPATIENT
Start: 2018-08-31 | End: 2018-08-31

## 2018-08-31 RX ADMIN — CYANOCOBALAMIN 1000 MCG: 1000 INJECTION, SOLUTION INTRAMUSCULAR; SUBCUTANEOUS at 16:20

## 2018-10-10 ENCOUNTER — TELEPHONE (OUTPATIENT)
Dept: FAMILY MEDICINE CLINIC | Facility: CLINIC | Age: 78
End: 2018-10-10

## 2018-10-10 NOTE — TELEPHONE ENCOUNTER
[facility] [phone] CAN DRAW LAB FOR DEPAKOTE LEVEL AS PT HAS BEEN HAVING. MORE TREMORS. THAN USUAL AND THEY WILL BE DISCHARGING SOON AS PT IS DOING SO WELL PLEASE ADVISE AND THEY CAN DRAW. PLEASE CALL [facility]

## 2018-10-11 NOTE — TELEPHONE ENCOUNTER
Please call.  Okay to draw a Depakote level and also check CBC, CMP, TSH and total T3.  Diagnoses:  tremors

## 2018-10-15 ENCOUNTER — TELEPHONE (OUTPATIENT)
Dept: FAMILY MEDICINE CLINIC | Facility: CLINIC | Age: 78
End: 2018-10-15

## 2018-10-15 NOTE — TELEPHONE ENCOUNTER
----- Message from Rosemary Christensen sent at 10/15/2018  3:49 PM EDT -----  Contact: [facility] NEEDS AN ORDER TO DRAW A DEPAKOTE LEVEL, IT WAS DRAWN IN THE WRONG TUBE AND NEEDS TO BE REDRAWN SO THEY NEED A NEW ORDER.     PHONE [phone]  [facility]

## 2018-11-07 DIAGNOSIS — F41.9 ANXIETY: ICD-10-CM

## 2018-11-07 RX ORDER — HYDROXYZINE PAMOATE 25 MG/1
CAPSULE ORAL
Qty: 270 CAPSULE | Refills: 0 | Status: SHIPPED | OUTPATIENT
Start: 2018-11-07 | End: 2019-04-24 | Stop reason: SDUPTHER

## 2018-12-03 RX ORDER — QUETIAPINE FUMARATE 100 MG/1
TABLET, FILM COATED ORAL
Qty: 30 TABLET | Refills: 2 | Status: SHIPPED | OUTPATIENT
Start: 2018-12-03 | End: 2019-02-25 | Stop reason: SDUPTHER

## 2018-12-05 DIAGNOSIS — E78.2 MIXED HYPERLIPIDEMIA: ICD-10-CM

## 2018-12-05 DIAGNOSIS — I10 BENIGN ESSENTIAL HYPERTENSION: ICD-10-CM

## 2018-12-05 RX ORDER — SIMVASTATIN 20 MG
TABLET ORAL
Qty: 30 TABLET | Refills: 0 | Status: SHIPPED | OUTPATIENT
Start: 2018-12-05 | End: 2019-01-02 | Stop reason: SDUPTHER

## 2018-12-05 RX ORDER — AMLODIPINE BESYLATE 10 MG/1
TABLET ORAL
Qty: 30 TABLET | Refills: 0 | Status: SHIPPED | OUTPATIENT
Start: 2018-12-05 | End: 2019-01-02 | Stop reason: SDUPTHER

## 2018-12-07 ENCOUNTER — TELEPHONE (OUTPATIENT)
Dept: FAMILY MEDICINE CLINIC | Facility: CLINIC | Age: 78
End: 2018-12-07

## 2018-12-07 DIAGNOSIS — R46.89 BEHAVIORAL CHANGE: Primary | ICD-10-CM

## 2018-12-07 NOTE — TELEPHONE ENCOUNTER
----- Message from Rosemary Mendoza sent at 12/7/2018 11:52 AM EST -----  Contact: DANIELA FROM [facility] IS REQUESTING HOME HEALTH ([facility]) TO COME IN  TO CHECK FOR UTI-PT IS ACTING OUT OF CHARACTER -HITTING THE WORKERS  AND CURSING PEOPLE OUT    [phone]

## 2018-12-07 NOTE — TELEPHONE ENCOUNTER
Please call, this is OK for u/a for home health. To ER if increased symptoms and getting worse. Not sure if we need to call [facility] or if [facility] will

## 2018-12-07 NOTE — TELEPHONE ENCOUNTER
Pt has been discharged from [facility] in October.  Will need new referral placed and faxed today for them to be able to see her by tomorrow. I informed Madyson at [facility], if things worsen she needs to go to ER.

## 2018-12-11 ENCOUNTER — TELEPHONE (OUTPATIENT)
Dept: FAMILY MEDICINE CLINIC | Facility: CLINIC | Age: 78
End: 2018-12-11

## 2018-12-11 DIAGNOSIS — N30.00 ACUTE CYSTITIS WITHOUT HEMATURIA: Primary | ICD-10-CM

## 2018-12-11 NOTE — TELEPHONE ENCOUNTER
See result note.  I left another message for her niece, Noris Brooks at [phone] to determine which pharmacy she is using.  Please let me know as soon as possible.

## 2018-12-12 RX ORDER — NITROFURANTOIN 25; 75 MG/1; MG/1
100 CAPSULE ORAL 2 TIMES DAILY
Qty: 14 CAPSULE | Refills: 0 | Status: SHIPPED | OUTPATIENT
Start: 2018-12-12

## 2018-12-31 DIAGNOSIS — N39.46 MIXED INCONTINENCE: ICD-10-CM

## 2018-12-31 DIAGNOSIS — I10 BENIGN ESSENTIAL HYPERTENSION: ICD-10-CM

## 2018-12-31 RX ORDER — GABAPENTIN 800 MG/1
TABLET ORAL
Qty: 90 TABLET | Refills: 1 | Status: SHIPPED | OUTPATIENT
Start: 2018-12-31 | End: 2019-02-25 | Stop reason: SDUPTHER

## 2018-12-31 RX ORDER — POTASSIUM CHLORIDE 750 MG/1
TABLET, FILM COATED, EXTENDED RELEASE ORAL
Qty: 120 TABLET | Refills: 1 | Status: SHIPPED | OUTPATIENT
Start: 2018-12-31 | End: 2019-02-25 | Stop reason: SDUPTHER

## 2018-12-31 RX ORDER — OXYBUTYNIN CHLORIDE 5 MG/1
TABLET ORAL
Qty: 60 TABLET | Refills: 1 | Status: SHIPPED | OUTPATIENT
Start: 2018-12-31 | End: 2019-02-25 | Stop reason: SDUPTHER

## 2018-12-31 RX ORDER — TRAZODONE HYDROCHLORIDE 50 MG/1
TABLET ORAL
Qty: 15 TABLET | Refills: 1 | Status: SHIPPED | OUTPATIENT
Start: 2018-12-31 | End: 2019-02-25 | Stop reason: SDUPTHER

## 2018-12-31 RX ORDER — AMLODIPINE BESYLATE 10 MG/1
TABLET ORAL
Qty: 30 TABLET | Refills: 1 | Status: SHIPPED | OUTPATIENT
Start: 2018-12-31 | End: 2019-01-02 | Stop reason: SDUPTHER

## 2018-12-31 RX ORDER — DULOXETIN HYDROCHLORIDE 60 MG/1
CAPSULE, DELAYED RELEASE ORAL
Qty: 30 CAPSULE | Refills: 1 | Status: SHIPPED | OUTPATIENT
Start: 2018-12-31 | End: 2019-02-25 | Stop reason: SDUPTHER

## 2019-01-01 DIAGNOSIS — E78.2 MIXED HYPERLIPIDEMIA: ICD-10-CM

## 2019-01-01 DIAGNOSIS — I10 BENIGN ESSENTIAL HYPERTENSION: ICD-10-CM

## 2019-01-02 RX ORDER — SIMVASTATIN 20 MG
TABLET ORAL
Qty: 30 TABLET | Refills: 0 | Status: SHIPPED | OUTPATIENT
Start: 2019-01-02 | End: 2019-02-25 | Stop reason: SDUPTHER

## 2019-01-02 RX ORDER — AMLODIPINE BESYLATE 10 MG/1
TABLET ORAL
Qty: 30 TABLET | Refills: 0 | Status: SHIPPED | OUTPATIENT
Start: 2019-01-02 | End: 2019-04-23 | Stop reason: SDUPTHER

## 2019-01-17 ENCOUNTER — TELEPHONE (OUTPATIENT)
Dept: FAMILY MEDICINE CLINIC | Facility: CLINIC | Age: 79
End: 2019-01-17

## 2019-01-17 NOTE — TELEPHONE ENCOUNTER
----- Message from Heather Mcnamara sent at 1/17/2019  1:51 PM EST -----  Contact: saran; verbal orders needed   [facility] health called and they are needing verbal orders for physical therapy for 2 times a week for 2 weeks.     Call back   [phone]  [facility] physical therapist.

## 2019-01-28 DIAGNOSIS — F03.90 MAJOR NEUROCOGNITIVE DISORDER (HCC): ICD-10-CM

## 2019-01-29 RX ORDER — DONEPEZIL HYDROCHLORIDE 10 MG/1
TABLET, FILM COATED ORAL
Qty: 30 TABLET | Refills: 1 | Status: SHIPPED | OUTPATIENT
Start: 2019-01-29 | End: 2019-03-25 | Stop reason: SDUPTHER

## 2019-01-29 RX ORDER — MEMANTINE HYDROCHLORIDE 14 MG/1
CAPSULE, EXTENDED RELEASE ORAL
Qty: 30 CAPSULE | Refills: 1 | Status: SHIPPED | OUTPATIENT
Start: 2019-01-29 | End: 2019-03-25 | Stop reason: SDUPTHER

## 2019-02-21 ENCOUNTER — TELEPHONE (OUTPATIENT)
Dept: FAMILY MEDICINE CLINIC | Facility: CLINIC | Age: 79
End: 2019-02-21

## 2019-02-21 NOTE — TELEPHONE ENCOUNTER
Maria Eugenia said she would need to be seen again. She will call [facility] and have them get an appointment set up for her.

## 2019-02-21 NOTE — TELEPHONE ENCOUNTER
----- Message from Heather Mcnamara sent at 2/21/2019  8:38 AM EST -----  Contact: saran; pt call back   Noris audelia ( caregiver) Called in, pt has a UTI and she is still acting confused, They are wanting to know if this is the dementia or if it is the UTI still. Noris is wanting to know if she can bring a urine sample by to have it checked for UTI.     CALL BACK     [phone]

## 2019-02-21 NOTE — TELEPHONE ENCOUNTER
----- Message from Fuad Land sent at 2/21/2019  2:16 PM EST -----  Contact: ROGELIO WITH [facility]: [facility]  NEEDS PHYSICAN ORDER FOR PHYSICAL THERAPY ALONG WITH MOST RECENT OFFICE VISIT AND DEMOGRAPHIC SHEET    FAX: [phone]  ROGELIO: [phone]

## 2019-02-25 DIAGNOSIS — F03.90 MAJOR NEUROCOGNITIVE DISORDER (HCC): ICD-10-CM

## 2019-02-25 DIAGNOSIS — E78.2 MIXED HYPERLIPIDEMIA: ICD-10-CM

## 2019-02-25 DIAGNOSIS — N39.46 MIXED INCONTINENCE: ICD-10-CM

## 2019-02-25 RX ORDER — DIVALPROEX SODIUM 125 MG/1
CAPSULE, COATED PELLETS ORAL
Qty: 180 CAPSULE | Refills: 1 | Status: SHIPPED | OUTPATIENT
Start: 2019-02-25 | End: 2019-04-23 | Stop reason: SDUPTHER

## 2019-02-25 RX ORDER — DULOXETIN HYDROCHLORIDE 60 MG/1
CAPSULE, DELAYED RELEASE ORAL
Qty: 30 CAPSULE | Refills: 1 | Status: SHIPPED | OUTPATIENT
Start: 2019-02-25 | End: 2019-04-23 | Stop reason: SDUPTHER

## 2019-02-25 RX ORDER — QUETIAPINE FUMARATE 100 MG/1
TABLET, FILM COATED ORAL
Qty: 30 TABLET | Refills: 1 | Status: SHIPPED | OUTPATIENT
Start: 2019-02-25 | End: 2019-04-23 | Stop reason: SDUPTHER

## 2019-02-25 RX ORDER — POTASSIUM CHLORIDE 750 MG/1
TABLET, FILM COATED, EXTENDED RELEASE ORAL
Qty: 120 TABLET | Refills: 1 | Status: SHIPPED | OUTPATIENT
Start: 2019-02-25 | End: 2019-04-23 | Stop reason: SDUPTHER

## 2019-02-25 RX ORDER — TRAZODONE HYDROCHLORIDE 50 MG/1
TABLET ORAL
Qty: 15 TABLET | Refills: 1 | Status: SHIPPED | OUTPATIENT
Start: 2019-02-25 | End: 2019-04-23 | Stop reason: SDUPTHER

## 2019-02-25 RX ORDER — OXYBUTYNIN CHLORIDE 5 MG/1
TABLET ORAL
Qty: 60 TABLET | Refills: 1 | Status: SHIPPED | OUTPATIENT
Start: 2019-02-25 | End: 2019-04-23 | Stop reason: SDUPTHER

## 2019-02-25 RX ORDER — SIMVASTATIN 20 MG
TABLET ORAL
Qty: 30 TABLET | Refills: 1 | Status: SHIPPED | OUTPATIENT
Start: 2019-02-25 | End: 2019-04-23 | Stop reason: SDUPTHER

## 2019-02-25 RX ORDER — GABAPENTIN 800 MG/1
TABLET ORAL
Qty: 90 TABLET | Refills: 1 | Status: SHIPPED | OUTPATIENT
Start: 2019-02-25

## 2019-02-28 DIAGNOSIS — M06.9 RHEUMATOID ARTHRITIS INVOLVING MULTIPLE SITES, UNSPECIFIED RHEUMATOID FACTOR PRESENCE: Primary | ICD-10-CM

## 2019-02-28 RX ORDER — FOLIC ACID 1 MG/1
1 TABLET ORAL DAILY
Qty: 90 TABLET | Refills: 1 | Status: SHIPPED | OUTPATIENT
Start: 2019-02-28

## 2019-02-28 RX ORDER — PREDNISONE 1 MG/1
5 TABLET ORAL DAILY
Qty: 90 TABLET | Refills: 0 | Status: SHIPPED | OUTPATIENT
Start: 2019-02-28

## 2019-03-01 ENCOUNTER — OFFICE VISIT (OUTPATIENT)
Dept: FAMILY MEDICINE CLINIC | Facility: CLINIC | Age: 79
End: 2019-03-01

## 2019-03-01 ENCOUNTER — TELEPHONE (OUTPATIENT)
Dept: FAMILY MEDICINE CLINIC | Facility: CLINIC | Age: 79
End: 2019-03-01

## 2019-03-01 VITALS
RESPIRATION RATE: 18 BRPM | HEIGHT: 60 IN | DIASTOLIC BLOOD PRESSURE: 62 MMHG | BODY MASS INDEX: 32.2 KG/M2 | HEART RATE: 50 BPM | WEIGHT: 164 LBS | TEMPERATURE: 97.6 F | SYSTOLIC BLOOD PRESSURE: 118 MMHG

## 2019-03-01 DIAGNOSIS — Z23 NEED FOR PNEUMOCOCCAL VACCINE: ICD-10-CM

## 2019-03-01 DIAGNOSIS — R53.81 DEBILITY: ICD-10-CM

## 2019-03-01 DIAGNOSIS — R41.0 CONFUSION: ICD-10-CM

## 2019-03-01 DIAGNOSIS — E53.8 B12 DEFICIENCY: ICD-10-CM

## 2019-03-01 DIAGNOSIS — R41.0 DISORIENTATION: ICD-10-CM

## 2019-03-01 DIAGNOSIS — R25.1 TREMOR: ICD-10-CM

## 2019-03-01 DIAGNOSIS — R53.1 WEAKNESS: Primary | ICD-10-CM

## 2019-03-01 DIAGNOSIS — I10 BENIGN ESSENTIAL HYPERTENSION: ICD-10-CM

## 2019-03-01 LAB
BILIRUB BLD-MCNC: NEGATIVE MG/DL
CLARITY, POC: CLEAR
COLOR UR: YELLOW
GLUCOSE UR STRIP-MCNC: NEGATIVE MG/DL
KETONES UR QL: NEGATIVE
LEUKOCYTE EST, POC: NEGATIVE
NITRITE UR-MCNC: NEGATIVE MG/ML
PH UR: 7 [PH] (ref 5–8)
PROT UR STRIP-MCNC: ABNORMAL MG/DL
RBC # UR STRIP: NEGATIVE /UL
SP GR UR: 1.01 (ref 1–1.03)
UROBILINOGEN UR QL: NORMAL

## 2019-03-01 PROCEDURE — 96372 THER/PROPH/DIAG INJ SC/IM: CPT | Performed by: FAMILY MEDICINE

## 2019-03-01 PROCEDURE — 99214 OFFICE O/P EST MOD 30 MIN: CPT | Performed by: FAMILY MEDICINE

## 2019-03-01 PROCEDURE — 90670 PCV13 VACCINE IM: CPT | Performed by: FAMILY MEDICINE

## 2019-03-01 PROCEDURE — 81003 URINALYSIS AUTO W/O SCOPE: CPT | Performed by: FAMILY MEDICINE

## 2019-03-01 PROCEDURE — G0009 ADMIN PNEUMOCOCCAL VACCINE: HCPCS | Performed by: FAMILY MEDICINE

## 2019-03-01 RX ORDER — CYANOCOBALAMIN 1000 UG/ML
1000 INJECTION, SOLUTION INTRAMUSCULAR; SUBCUTANEOUS
Status: SHIPPED | OUTPATIENT
Start: 2019-03-01

## 2019-03-01 RX ADMIN — CYANOCOBALAMIN 1000 MCG: 1000 INJECTION, SOLUTION INTRAMUSCULAR; SUBCUTANEOUS at 13:17

## 2019-03-01 NOTE — PROGRESS NOTES
Assessment/Plan       Problems Addressed this Visit        Cardiovascular and Mediastinum    Benign essential hypertension       Digestive    B12 deficiency    Relevant Medications    cyanocobalamin injection 1,000 mcg      Other Visit Diagnoses     Weakness    -  Primary    Relevant Orders    Ambulatory Referral to Home Health    Disorientation        Relevant Orders    Ambulatory Referral to Home Health    POC Urinalysis Dipstick, Automated (Completed)    Confusion        Relevant Orders    Ambulatory Referral to Home Health    POC Urinalysis Dipstick, Automated (Completed)    Debility        Relevant Orders    Ambulatory Referral to Home Health    Tremor        Need for pneumococcal vaccine        Relevant Orders    Pneumococcal Conjugate Vaccine 13-Valent All (PCV13) (Completed)            Follow up: Return in about 3 months (around 6/1/2019).     DISCUSSION  Patient is here for follow-up.  Has reports of worsening weakness, confusion and disorientation.  Recent urinary tract infection was treated.  Not sure if completely cleared.  Urinalysis today was normal.  Recommend physical therapy and home health.  Patient agrees.    Tremor.  No change.  Continue medication.    Hypertension.  Blood pressure stable.    B12 deficiency.  B12 injection today.  Call for labs done recently at the emergency room including CBC.    Prevnar 13 today.  Vaccine information statement given.      MEDICATIONS PRESCRIBED  Requested Prescriptions      No prescriptions requested or ordered in this encounter              -------------------------------------------    Subjective     Chief Complaint   Patient presents with   • med review         History of Present Illness    Living at [facility]    Debility  Patient is here today with her niece.  Reports that she has increasing weakness and debility.  Recent urinary tract infection and was seen in the emergency room.  Given antibiotic.  Not sure if completely cleared up.  Still has  "problems with memory and confusion at times.  Her niece reports that sometimes she tries to leave the facility but was worse when she had the urinary tract infection.    Hypertension.  Has been stable.  On medication.  No reported chest pain or shortness of breath.  Quit smoking when she moved to [facility].  No reported swelling.    B12 deficiency.  Has been a while since she had B12 shot.  Due for this.  Has had fatigue as well.    Urinary tract infection    Recent urinary tract infection treated at emergency room.  No reports of painful urination or increased frequency at this time.  Has had some confusion but was worse when she had the acute infection.    Social History     Tobacco Use   Smoking Status Former Smoker   • Last attempt to quit: 2018   • Years since quittin.0   Smokeless Tobacco Never Used        Past Medical History,Medications, Allergies, and social history was reviewed.      Review of Systems   Constitutional: Positive for fatigue.   HENT: Negative.    Respiratory: Negative.    Cardiovascular: Negative.    Gastrointestinal: Negative.    Musculoskeletal: Positive for arthralgias ( Has rheumatoid arthritis.  Still sees rheumatologist.) and gait problem (weakness).   Neurological: Positive for memory problem and confusion.   Psychiatric/Behavioral: Positive for decreased concentration. The patient is nervous/anxious.        Objective     Vitals:    19 1251   BP: 118/62   Pulse: 50   Resp: 18   Temp: 97.6 °F (36.4 °C)   Weight: 74.4 kg (164 lb)   Height: 152.4 cm (60\")          Physical Exam   Constitutional: She is oriented to person, place, and time. She appears well-developed and well-nourished.   HENT:   Head: Normocephalic and atraumatic.   Right Ear: Hearing and external ear normal.   Left Ear: Hearing and external ear normal.   Mouth/Throat: Oropharynx is clear and moist.   Eyes: Conjunctivae and EOM are normal. Pupils are equal, round, and reactive to light.   Cardiovascular: " Normal rate, regular rhythm and normal heart sounds. Exam reveals no friction rub.   No murmur heard.  Pulmonary/Chest: Effort normal and breath sounds normal. No respiratory distress. She has no wheezes. She has no rales.   Abdominal: Soft. Bowel sounds are normal. She exhibits no distension and no mass. There is no tenderness. There is no rebound and no guarding.   Musculoskeletal: She exhibits no edema.   Changes of hands consistent with rheumatoid arthritis   Neurological: She is alert and oriented to person, place, and time.   Skin: Skin is warm.   Psychiatric: She has a normal mood and affect. Her behavior is normal.   Nursing note and vitals reviewed.    Urinalysis was normal except trace protein.            Dick Blunt MD

## 2019-03-06 ENCOUNTER — TELEPHONE (OUTPATIENT)
Dept: FAMILY MEDICINE CLINIC | Facility: CLINIC | Age: 79
End: 2019-03-06

## 2019-03-06 NOTE — TELEPHONE ENCOUNTER
----- Message from Rosemary Mendoza sent at 3/6/2019  2:37 PM EST -----  Contact: NGA MEHTA FROM NURSE ON CALL  CALLING TO LET DR CARRASCO KNOW PT EVAL IS FINISHED AND WILL SEE PT  FOR STRENGTHENING AND MOBILITY TRAINING    RJICQAL-[phone]

## 2019-03-11 ENCOUNTER — TELEPHONE (OUTPATIENT)
Dept: FAMILY MEDICINE CLINIC | Facility: CLINIC | Age: 79
End: 2019-03-11

## 2019-03-11 NOTE — TELEPHONE ENCOUNTER
Patient's niece stated that they are taking her to the Mental health facility in [place] on Wednesday. They believe that this will work for her.

## 2019-03-11 NOTE — TELEPHONE ENCOUNTER
----- Message from Maria Eugenia Uribe, RegSched Rep sent at 3/11/2019 12:10 PM EDT -----  Contact: PT CHARLES CARRASCO  PATIENT IS IN ER RIGHT NOW.  PATIENT CANNOT STAY AT [facility] BECAUSE OF HER DEMENTIA.  SHE IS GOING TO TALK TO THE ER DR TO SEE IF THEY WILL KEEP HER BUT SHE WANTS TO GET DR DUARTE OPINION ON WHICH NURSING HOME TO TAKE HER TOO AND IF THE ER DR DOESN'T ADMITTED HER CAN DR CARRASCO ADMIT HER.    PT: [phone]

## 2019-03-11 NOTE — TELEPHONE ENCOUNTER
Please call, I have no way to admit her. ER MD will have to do that and that would generate process to get admitted to nursing home and which one depends on who has an opening.

## 2019-03-25 DIAGNOSIS — F03.90 MAJOR NEUROCOGNITIVE DISORDER (HCC): ICD-10-CM

## 2019-03-25 RX ORDER — MEMANTINE HYDROCHLORIDE 14 MG/1
CAPSULE, EXTENDED RELEASE ORAL
Qty: 30 CAPSULE | Refills: 2 | Status: SHIPPED | OUTPATIENT
Start: 2019-03-25

## 2019-03-25 RX ORDER — DONEPEZIL HYDROCHLORIDE 10 MG/1
TABLET, FILM COATED ORAL
Qty: 30 TABLET | Refills: 2 | Status: SHIPPED | OUTPATIENT
Start: 2019-03-25

## 2019-04-22 DIAGNOSIS — I10 BENIGN ESSENTIAL HYPERTENSION: ICD-10-CM

## 2019-04-22 RX ORDER — LISINOPRIL 20 MG/1
TABLET ORAL
Qty: 30 TABLET | Refills: 1 | Status: SHIPPED | OUTPATIENT
Start: 2019-04-22

## 2019-04-23 DIAGNOSIS — N39.46 MIXED INCONTINENCE: ICD-10-CM

## 2019-04-23 DIAGNOSIS — E78.2 MIXED HYPERLIPIDEMIA: ICD-10-CM

## 2019-04-23 DIAGNOSIS — F03.90 MAJOR NEUROCOGNITIVE DISORDER (HCC): ICD-10-CM

## 2019-04-23 DIAGNOSIS — I10 BENIGN ESSENTIAL HYPERTENSION: ICD-10-CM

## 2019-04-23 RX ORDER — TRAZODONE HYDROCHLORIDE 50 MG/1
TABLET ORAL
Qty: 15 TABLET | Refills: 2 | Status: SHIPPED | OUTPATIENT
Start: 2019-04-23

## 2019-04-23 RX ORDER — POTASSIUM CHLORIDE 750 MG/1
TABLET, FILM COATED, EXTENDED RELEASE ORAL
Qty: 120 TABLET | Refills: 2 | Status: SHIPPED | OUTPATIENT
Start: 2019-04-23

## 2019-04-23 RX ORDER — OXYBUTYNIN CHLORIDE 5 MG/1
TABLET ORAL
Qty: 60 TABLET | Refills: 2 | Status: SHIPPED | OUTPATIENT
Start: 2019-04-23

## 2019-04-23 RX ORDER — DIVALPROEX SODIUM 125 MG/1
CAPSULE, COATED PELLETS ORAL
Qty: 180 CAPSULE | Refills: 2 | Status: SHIPPED | OUTPATIENT
Start: 2019-04-23

## 2019-04-23 RX ORDER — AMLODIPINE BESYLATE 10 MG/1
TABLET ORAL
Qty: 30 TABLET | Refills: 2 | Status: SHIPPED | OUTPATIENT
Start: 2019-04-23

## 2019-04-23 RX ORDER — QUETIAPINE FUMARATE 100 MG/1
TABLET, FILM COATED ORAL
Qty: 30 TABLET | Refills: 2 | Status: SHIPPED | OUTPATIENT
Start: 2019-04-23

## 2019-04-23 RX ORDER — DULOXETIN HYDROCHLORIDE 60 MG/1
CAPSULE, DELAYED RELEASE ORAL
Qty: 30 CAPSULE | Refills: 2 | Status: SHIPPED | OUTPATIENT
Start: 2019-04-23

## 2019-04-23 RX ORDER — SIMVASTATIN 20 MG
TABLET ORAL
Qty: 30 TABLET | Refills: 2 | Status: SHIPPED | OUTPATIENT
Start: 2019-04-23

## 2019-04-24 DIAGNOSIS — F41.9 ANXIETY: ICD-10-CM

## 2019-04-24 RX ORDER — HYDROXYZINE PAMOATE 25 MG/1
CAPSULE ORAL
Qty: 270 CAPSULE | Refills: 0 | Status: SHIPPED | OUTPATIENT
Start: 2019-04-24

## 2019-06-25 ENCOUNTER — TELEPHONE (OUTPATIENT)
Dept: FAMILY MEDICINE CLINIC | Facility: CLINIC | Age: 79
End: 2019-06-25

## 2019-06-25 NOTE — TELEPHONE ENCOUNTER
Called and spoke with Autumn, states that is fine if that is all we have. Needed faxed to her at . Record has been faxed.

## 2019-06-25 NOTE — TELEPHONE ENCOUNTER
----- Message from Rosemary Mendoza sent at 6/25/2019  9:23 AM EDT -----  Contact: RADHA FROM [facility] NEEDS [facility] RECORDS FAXED [phone]    KOISF-[phone]

## 2019-06-25 NOTE — TELEPHONE ENCOUNTER
Please call, the only immunization we have is a Prevnar 13 given on March 1, 2019.  Okay to fax since information if they need it.

## 2019-12-05 ENCOUNTER — PATIENT OUTREACH (OUTPATIENT)
Dept: CASE MANAGEMENT | Facility: OTHER | Age: 79
End: 2019-12-05

## 2019-12-05 NOTE — OUTREACH NOTE
Care Coordination Note    Spoke with business office staff at [facility] in [facility] to confirm pt is a long term care resident under private pay. Has [employer] [place] insurance. No outreach.    Aj Hilario RN  Ambulatory     12/5/2019, 12:04 PM

## 2020-01-10 ENCOUNTER — EPISODE CHANGES (OUTPATIENT)
Dept: CASE MANAGEMENT | Facility: OTHER | Age: 80
End: 2020-01-10

## 2020-10-17 NOTE — TELEPHONE ENCOUNTER
----- Message from Fuad Rutherford sent at 6/8/2018  2:34 PM EDT -----  Contact: [facility] / [facility]  PT IS CHANGING PHARMACY TO [facility].  THEY NEED ALL RX'S SENT OVER     PLEASE UPDATE CHART TO [facility] [phone]   - [phone]   Never

## 2021-11-29 NOTE — TELEPHONE ENCOUNTER
SPOKE WITH PAUL AND INFORMED HER OF THIS AND NIECE WILLARD HAS BEEN GIVEN VAG.  CREAM FOR YEAST INFECTION AND MAYBE NEEDS DIFLUCAN. PLEASE SEND IN FOR PT.    Unable to assess

## 2022-04-04 NOTE — TELEPHONE ENCOUNTER
Separate phone message regarding this matter rcvd today:    Patient went to the ER at [facility] yesterday and did blood work and was told she was not dehydrated but she had a urinary tract infection. Noris wanted to let Dr. Blunt know. If any questions, please call [phone].   23

## 2022-12-16 NOTE — ASSESSMENT & PLAN NOTE
Continue medications.  On chronic pain medication.  Stable.  Denies misuse or diversion.   KIMMY for signature.